# Patient Record
Sex: FEMALE | Race: WHITE | NOT HISPANIC OR LATINO
[De-identification: names, ages, dates, MRNs, and addresses within clinical notes are randomized per-mention and may not be internally consistent; named-entity substitution may affect disease eponyms.]

---

## 2020-11-17 ENCOUNTER — APPOINTMENT (OUTPATIENT)
Dept: HEART AND VASCULAR | Facility: CLINIC | Age: 70
End: 2020-11-17
Payer: COMMERCIAL

## 2020-11-17 ENCOUNTER — LABORATORY RESULT (OUTPATIENT)
Age: 70
End: 2020-11-17

## 2020-11-17 ENCOUNTER — NON-APPOINTMENT (OUTPATIENT)
Age: 70
End: 2020-11-17

## 2020-11-17 VITALS
BODY MASS INDEX: 23.16 KG/M2 | HEART RATE: 73 BPM | TEMPERATURE: 98.1 F | DIASTOLIC BLOOD PRESSURE: 84 MMHG | WEIGHT: 139 LBS | HEIGHT: 65 IN | SYSTOLIC BLOOD PRESSURE: 114 MMHG

## 2020-11-17 DIAGNOSIS — Z72.89 OTHER PROBLEMS RELATED TO LIFESTYLE: ICD-10-CM

## 2020-11-17 PROCEDURE — 99072 ADDL SUPL MATRL&STAF TM PHE: CPT

## 2020-11-17 PROCEDURE — 99204 OFFICE O/P NEW MOD 45 MIN: CPT

## 2020-11-17 PROCEDURE — 93000 ELECTROCARDIOGRAM COMPLETE: CPT

## 2020-11-17 NOTE — DISCUSSION/SUMMARY
[FreeTextEntry1] : Assessment/Plan:\par Patient is 70 year old female +smoker, +etoh seen today for cardiac eval. Reports 2 month history of exertional SOB and easy fatigue. Prior to 2 months, pt was very active without any exertional symptoms. Now unable to walk 10-15 min without fatigue/SOB. Pt denies CP at rest or exertion. No dizziness/palpitations. No LOC\par \par -Recent onset exertional/easy fatigue: EKG NSR with possible Qs III/AVF, v1-v2. Check ECHO - check EF, r/o wall motion abn. Check CBC - r/o anemia. Check TSH. CMP, lipids, hgba1c. \par Possible ischemic work up (CCTA) pending results of ECHO\par \par FU 2 weeks

## 2020-11-17 NOTE — REASON FOR VISIT
[Initial Evaluation] : an initial evaluation of [Dyspnea] : dyspnea [Fatigue] : feeling tired (fatigue)

## 2020-11-17 NOTE — HISTORY OF PRESENT ILLNESS
[FreeTextEntry1] : Patient is 70 year old female +smoker, +etoh seen today for cardiac eval. Reports 2 month history of exertional SOB and easy fatigue. Prior to 2 months, pt was very active without any exertional symptoms. Now unable to walk 10-15 min without fatigue/SOB. Pt denies CP at rest or exertion. No dizziness/palpitations. No LOC

## 2020-11-17 NOTE — PHYSICAL EXAM
[General Appearance - Well Developed] : well developed [Normal Appearance] : normal appearance [Well Groomed] : well groomed [General Appearance - Well Nourished] : well nourished [No Deformities] : no deformities [General Appearance - In No Acute Distress] : no acute distress [Normal Conjunctiva] : the conjunctiva exhibited no abnormalities [Eyelids - No Xanthelasma] : the eyelids demonstrated no xanthelasmas [Normal Oral Mucosa] : normal oral mucosa [No Oral Pallor] : no oral pallor [No Oral Cyanosis] : no oral cyanosis [Normal Jugular Venous A Waves Present] : normal jugular venous A waves present [Normal Jugular Venous V Waves Present] : normal jugular venous V waves present [No Jugular Venous Alvarez A Waves] : no jugular venous alvarez A waves [Heart Rate And Rhythm] : heart rate and rhythm were normal [Heart Sounds] : normal S1 and S2 [Murmurs] : no murmurs present [Respiration, Rhythm And Depth] : normal respiratory rhythm and effort [Exaggerated Use Of Accessory Muscles For Inspiration] : no accessory muscle use [Auscultation Breath Sounds / Voice Sounds] : lungs were clear to auscultation bilaterally [Abdomen Soft] : soft [Abdomen Tenderness] : non-tender [Abdomen Mass (___ Cm)] : no abdominal mass palpated [Abnormal Walk] : normal gait [Gait - Sufficient For Exercise Testing] : the gait was sufficient for exercise testing [Nail Clubbing] : no clubbing of the fingernails [Cyanosis, Localized] : no localized cyanosis [Petechial Hemorrhages (___cm)] : no petechial hemorrhages [Skin Color & Pigmentation] : normal skin color and pigmentation [] : no rash [No Venous Stasis] : no venous stasis [Skin Lesions] : no skin lesions [No Skin Ulcers] : no skin ulcer [No Xanthoma] : no  xanthoma was observed [Oriented To Time, Place, And Person] : oriented to person, place, and time [Affect] : the affect was normal [Mood] : the mood was normal [No Anxiety] : not feeling anxious

## 2020-12-15 ENCOUNTER — APPOINTMENT (OUTPATIENT)
Dept: HEART AND VASCULAR | Facility: CLINIC | Age: 70
End: 2020-12-15

## 2021-03-02 ENCOUNTER — APPOINTMENT (OUTPATIENT)
Dept: HEART AND VASCULAR | Facility: CLINIC | Age: 71
End: 2021-03-02

## 2021-04-13 ENCOUNTER — APPOINTMENT (OUTPATIENT)
Dept: HEART AND VASCULAR | Facility: CLINIC | Age: 71
End: 2021-04-13
Payer: COMMERCIAL

## 2021-04-13 ENCOUNTER — NON-APPOINTMENT (OUTPATIENT)
Age: 71
End: 2021-04-13

## 2021-04-13 VITALS
WEIGHT: 138 LBS | SYSTOLIC BLOOD PRESSURE: 112 MMHG | TEMPERATURE: 97.6 F | HEART RATE: 72 BPM | DIASTOLIC BLOOD PRESSURE: 80 MMHG | BODY MASS INDEX: 22.99 KG/M2 | HEIGHT: 65 IN

## 2021-04-13 VITALS — SYSTOLIC BLOOD PRESSURE: 128 MMHG | DIASTOLIC BLOOD PRESSURE: 78 MMHG | HEART RATE: 78 BPM

## 2021-04-13 PROCEDURE — ZZZZZ: CPT

## 2021-04-13 PROCEDURE — 99072 ADDL SUPL MATRL&STAF TM PHE: CPT

## 2021-04-13 PROCEDURE — 99213 OFFICE O/P EST LOW 20 MIN: CPT | Mod: 25

## 2021-04-13 PROCEDURE — 93306 TTE W/DOPPLER COMPLETE: CPT

## 2021-04-13 NOTE — DISCUSSION/SUMMARY
[FreeTextEntry1] : Assessment/Plan:\par Patient is 70 year old female +smoker, +etoh seen today for follow up of echo results.Reports 5 month history of exertional SOB and easy fatigue. Prior to 5 months, pt was very active without any exertional symptoms. Now unable to walk 10-15 min, climb 1 flight of stairs without fatigue/SOB. Pt denies CP at rest or exertion. No dizziness/palpitations. No LOC\par \par -Recent onset exertional/easy fatigue: labs reviewed: all within normal. ECHO: EF normal with concentric LVH. Given age, sig smoking hx will r/o CAD with CCTA\par \par -BP controlled\par \par FU post CCTA

## 2021-04-13 NOTE — PHYSICAL EXAM
[General Appearance - Well Developed] : well developed [Normal Appearance] : normal appearance [Well Groomed] : well groomed [General Appearance - Well Nourished] : well nourished [No Deformities] : no deformities [General Appearance - In No Acute Distress] : no acute distress [Normal Conjunctiva] : the conjunctiva exhibited no abnormalities [Eyelids - No Xanthelasma] : the eyelids demonstrated no xanthelasmas [Normal Oral Mucosa] : normal oral mucosa [No Oral Pallor] : no oral pallor [No Oral Cyanosis] : no oral cyanosis [Normal Jugular Venous A Waves Present] : normal jugular venous A waves present [Normal Jugular Venous V Waves Present] : normal jugular venous V waves present [No Jugular Venous Alvarez A Waves] : no jugular venous alvarez A waves [Respiration, Rhythm And Depth] : normal respiratory rhythm and effort [Exaggerated Use Of Accessory Muscles For Inspiration] : no accessory muscle use [Auscultation Breath Sounds / Voice Sounds] : lungs were clear to auscultation bilaterally [Heart Rate And Rhythm] : heart rate and rhythm were normal [Heart Sounds] : normal S1 and S2 [Murmurs] : no murmurs present [Abdomen Soft] : soft [Abdomen Tenderness] : non-tender [Abdomen Mass (___ Cm)] : no abdominal mass palpated [Abnormal Walk] : normal gait [Gait - Sufficient For Exercise Testing] : the gait was sufficient for exercise testing [Nail Clubbing] : no clubbing of the fingernails [Cyanosis, Localized] : no localized cyanosis [Petechial Hemorrhages (___cm)] : no petechial hemorrhages [Skin Color & Pigmentation] : normal skin color and pigmentation [] : no rash [No Venous Stasis] : no venous stasis [No Skin Ulcers] : no skin ulcer [Skin Lesions] : no skin lesions [No Xanthoma] : no  xanthoma was observed [Oriented To Time, Place, And Person] : oriented to person, place, and time [Affect] : the affect was normal [Mood] : the mood was normal [No Anxiety] : not feeling anxious

## 2021-04-13 NOTE — REASON FOR VISIT
[Follow-Up - Clinic] : a clinic follow-up of [Dyspnea] : dyspnea [FreeTextEntry1] : and echo results

## 2021-04-13 NOTE — HISTORY OF PRESENT ILLNESS
[FreeTextEntry1] : Patient is 70 year old female +smoker, +etoh seen today for follow up of echo results. Still complaining of exertional SOB and easy fatigue. Prior to 5 months, pt was very active without any exertional symptoms. Now unable to walk 10-15 min or climb 1 flight of stairs without fatigue/SOB. Pt denies CP at rest or exertion. No dizziness/palpitations. No LOC

## 2021-05-12 ENCOUNTER — APPOINTMENT (OUTPATIENT)
Dept: CT IMAGING | Facility: CLINIC | Age: 71
End: 2021-05-12
Payer: COMMERCIAL

## 2021-05-12 ENCOUNTER — OUTPATIENT (OUTPATIENT)
Dept: OUTPATIENT SERVICES | Facility: HOSPITAL | Age: 71
LOS: 1 days | End: 2021-05-12

## 2021-05-12 PROCEDURE — 75574 CT ANGIO HRT W/3D IMAGE: CPT | Mod: 26

## 2021-06-15 ENCOUNTER — APPOINTMENT (OUTPATIENT)
Dept: HEART AND VASCULAR | Facility: CLINIC | Age: 71
End: 2021-06-15
Payer: COMMERCIAL

## 2021-06-15 VITALS
HEIGHT: 65 IN | OXYGEN SATURATION: 96 % | HEART RATE: 73 BPM | SYSTOLIC BLOOD PRESSURE: 110 MMHG | TEMPERATURE: 97.6 F | DIASTOLIC BLOOD PRESSURE: 84 MMHG

## 2021-06-15 PROCEDURE — 99072 ADDL SUPL MATRL&STAF TM PHE: CPT

## 2021-06-15 PROCEDURE — 99213 OFFICE O/P EST LOW 20 MIN: CPT

## 2021-06-15 NOTE — DISCUSSION/SUMMARY
[FreeTextEntry1] : Assessment/Plan:\par Patient is 71 year old female +smoker, +etoh seen today for follow up of CCTA results. Still complaining of exertional SOB and easy fatigue. Prior to 5 months, pt was very active without any exertional symptoms. Now unable to walk 10-15 min or climb 1 flight of stairs without fatigue/SOB. Pt denies CP at rest or exertion. No dizziness/palpitations. No LOC\par \par CCTA: Ca score 295 with moderate pLAD and mLAD disease. In setting of exertional SOB, plan for cardiac cath with FFR of LAD\par \par BP controlled\par \par FU post cath

## 2021-06-15 NOTE — HISTORY OF PRESENT ILLNESS
[FreeTextEntry1] : Patient is 71 year old female +smoker, +etoh seen today for follow up of CCTA results. Still complaining of exertional SOB and easy fatigue. Prior to 5 months, pt was very active without any exertional symptoms. Now unable to walk 10-15 min or climb 1 flight of stairs without fatigue/SOB. Pt denies CP at rest or exertion. No dizziness/palpitations. No LOC

## 2021-06-15 NOTE — CARDIOLOGY SUMMARY
[de-identified] : EF 60, concentric LVH [de-identified] : 5/12/21: Ca score 296, moderate prox LAD and mLAD

## 2021-08-30 ENCOUNTER — APPOINTMENT (OUTPATIENT)
Dept: INTERNAL MEDICINE | Facility: CLINIC | Age: 71
End: 2021-08-30
Payer: COMMERCIAL

## 2021-08-30 VITALS
WEIGHT: 138 LBS | SYSTOLIC BLOOD PRESSURE: 130 MMHG | TEMPERATURE: 97.7 F | HEART RATE: 76 BPM | OXYGEN SATURATION: 99 % | DIASTOLIC BLOOD PRESSURE: 76 MMHG | BODY MASS INDEX: 22.99 KG/M2 | HEIGHT: 65 IN

## 2021-08-30 DIAGNOSIS — F51.01 PRIMARY INSOMNIA: ICD-10-CM

## 2021-08-30 DIAGNOSIS — R91.1 SOLITARY PULMONARY NODULE: ICD-10-CM

## 2021-08-30 DIAGNOSIS — M79.604 PAIN IN RIGHT LEG: ICD-10-CM

## 2021-08-30 PROCEDURE — 99204 OFFICE O/P NEW MOD 45 MIN: CPT

## 2021-08-30 NOTE — PHYSICAL EXAM
[No Edema] : there was no peripheral edema [Normal] : affect was normal and insight and judgment were intact [de-identified] : right buttocks tenderness.

## 2021-08-30 NOTE — REVIEW OF SYSTEMS
[Fatigue] : fatigue [Dyspnea on Exertion] : dyspnea on exertion [Joint Pain] : joint pain [Muscle Pain] : muscle pain [Anxiety] : anxiety [Depression] : depression [Negative] : Cardiovascular

## 2021-08-30 NOTE — PLAN
[FreeTextEntry1] : Insomnia- rx for trazodone\par \par CAD -strongly encourage following up with cardiac cath - referral reprinted for patient\par check a lipid profile\par \par Emphysema - pulm eval\par \par Lung nodule\par  - send for CT chest\par \par Smoking - wellbutrin  sr - trial reviewed how tot marcie it and potential aes\par  -smoking cessation counseling performed\par \par Buttock and leg pain.  tylenol prn and referral for PMR

## 2021-08-30 NOTE — HISTORY OF PRESENT ILLNESS
[de-identified] : This is a 70 yo f with non-obstructive CAD, anxiety, depression, emphysema, smoker.  \par Needs a new PMD\par s/p fall and rib fracture - seen at [facility]. 8/20. - tripped in middle of the night.\par recently with fatigue and WEBB - s/p eval with Dr Mitchell -CT calium score over 200\par reports a pain in her buttocks.right sided worse with standing- feels it standing. \par was due to get cardiac cath but did not get it yet.  \par also reports leg pain at night  cramping\par not sleeping well.\par Dealing with dental work - In the process of removing her teeth - this has been depressing - currently with a denture.\par She is on cymbalta for years - for anxiety and depression   \par recent CT showed a 1.2cm groundglass nodular opacity, needs 3 mo follow up\par  - shows emphysema.  \par \par Current smoker - failed chantix - caused nightmares.   1/2 PPD to 1 PPD for 40+  years.  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 \par Dexa: 5 years ago?

## 2021-08-31 DIAGNOSIS — R07.81 PLEURODYNIA: ICD-10-CM

## 2021-08-31 LAB
CHOLEST SERPL-MCNC: 269 MG/DL
HDLC SERPL-MCNC: 68 MG/DL
LDLC SERPL CALC-MCNC: 182 MG/DL
NONHDLC SERPL-MCNC: 202 MG/DL
TRIGL SERPL-MCNC: 100 MG/DL

## 2021-09-20 ENCOUNTER — OUTPATIENT (OUTPATIENT)
Dept: OUTPATIENT SERVICES | Facility: HOSPITAL | Age: 71
LOS: 1 days | End: 2021-09-20
Payer: COMMERCIAL

## 2021-09-20 ENCOUNTER — APPOINTMENT (OUTPATIENT)
Dept: CT IMAGING | Facility: HOSPITAL | Age: 71
End: 2021-09-20
Payer: COMMERCIAL

## 2021-09-20 PROCEDURE — 75574 CT ANGIO HRT W/3D IMAGE: CPT | Mod: 26

## 2021-09-20 PROCEDURE — 75574 CT ANGIO HRT W/3D IMAGE: CPT

## 2021-09-28 ENCOUNTER — APPOINTMENT (OUTPATIENT)
Dept: HEART AND VASCULAR | Facility: CLINIC | Age: 71
End: 2021-09-28
Payer: COMMERCIAL

## 2021-09-28 VITALS
BODY MASS INDEX: 22.99 KG/M2 | TEMPERATURE: 95.5 F | HEART RATE: 67 BPM | SYSTOLIC BLOOD PRESSURE: 110 MMHG | HEIGHT: 65 IN | WEIGHT: 138 LBS | DIASTOLIC BLOOD PRESSURE: 80 MMHG

## 2021-09-28 PROCEDURE — 99214 OFFICE O/P EST MOD 30 MIN: CPT

## 2021-09-28 NOTE — HISTORY OF PRESENT ILLNESS
[FreeTextEntry1] : Patient is 71 year old female +smoker, +etoh seen today for follow up of CCTA results. Complaining of worsening exertional SOB and easy fatigue. Prior to 5 months, pt was very active without any exertional symptoms. Now unable to walk 10-15 min or climb 1 flight of stairs without fatigue/SOB. Pt denies CP at rest or exertion. No dizziness/palpitations. No LOC

## 2021-09-28 NOTE — DISCUSSION/SUMMARY
[FreeTextEntry1] : Assessment/Plan:\par Patient is 71 year old female +smoker, +etoh seen today for follow up of CCTA results. Complaining of worsening exertional SOB and easy fatigue. Prior to 5 months, pt was very active without any exertional symptoms. Now unable to walk 10-15 min or climb 1 flight of stairs without fatigue/SOB. Pt denies CP at rest or exertion. No dizziness/palpitations. No LOC \par \par -With worsening exertional SOB. In setting of CCTA findings, plan for cardiac cath Weds Oct 6 with Dr Carmine Ames\par -HLD: , started on crestor 20 mg qd. Add ASA 81 mg qd \par -Will fu post cath

## 2021-10-01 VITALS
DIASTOLIC BLOOD PRESSURE: 84 MMHG | OXYGEN SATURATION: 100 % | SYSTOLIC BLOOD PRESSURE: 166 MMHG | RESPIRATION RATE: 16 BRPM | WEIGHT: 138.01 LBS | HEIGHT: 65 IN | HEART RATE: 61 BPM | TEMPERATURE: 97 F

## 2021-10-01 RX ORDER — CHLORHEXIDINE GLUCONATE 213 G/1000ML
1 SOLUTION TOPICAL ONCE
Refills: 0 | Status: DISCONTINUED | OUTPATIENT
Start: 2021-10-06 | End: 2021-10-07

## 2021-10-01 NOTE — H&P ADULT - HISTORY OF PRESENT ILLNESS
Covid:   Pharmacy:  Escort:     ***[facility]***    72 y/o F, current smoker, ETOH user, with PMHx significant for HLD, COPD, depression, who presented to her cardiologist with c/o worsening exertional SOB and increased fatigue on walking 10-15 mins, and climbing one flight of stairs, relieved with rest, x 5 mos.  Denies any chest pain, dizziness, n/v, diaphoresis, orthopnea, PND, palpitations, LE edema, syncope.  CCTA on 9/20/21 revealed the calcium score at 435 Agatston units, severe mid RCA stenosis, and moderate proximal LAD stenosis.  ECHO 4/13/21: EF 60%, minimal MR, mild concentric LVH.    In light of pt's risk factors, CCS class III anginal equivalent symptoms, and recent abnormal CCTA, he is referred for cardiac cath with possible intervention if clinically indicated.     Covid:   Pharmacy:  Escort:     ***[facility]***    72 y/o F, current smoker, ETOH user, with PMHx significant for HLD, COPD/emphysema, depression, who presented to her cardiologist with c/o worsening exertional SOB and increased fatigue on walking 10-15 mins, and climbing one flight of stairs, relieved with rest, x 5 mos.  Denies any chest pain, dizziness, n/v, diaphoresis, orthopnea, PND, palpitations, LE edema, syncope.  CCTA on 9/20/21 revealed the calcium score at 435 Agatston units, severe mid RCA stenosis, and moderate proximal LAD stenosis.  ECHO 4/13/21: EF 60%, minimal MR, mild concentric LVH.    In light of pt's risk factors, CCS class III anginal equivalent symptoms, and recent abnormal CCTA, he is referred for cardiac cath with possible intervention if clinically indicated.     Covid negative 10/04 in [facility]  Pharmacy:  Escort:     70 y/o F, current smoker, ETOH user, with PMHx significant for HLD, COPD/emphysema, depression, who presented to her cardiologist with c/o worsening exertional SOB and increased fatigue on walking 10-15 mins, and climbing one flight of stairs, relieved with rest, x 5 mos.  Denies any chest pain, dizziness, n/v, diaphoresis, orthopnea, PND, palpitations, LE edema, syncope.  CCTA on 9/20/21 revealed the calcium score at 435 Agatston units, severe mid RCA stenosis, and moderate proximal LAD stenosis.  ECHO 4/13/21: EF 60%, minimal MR, mild concentric LVH.    In light of pt's risk factors, CCS class III anginal equivalent symptoms, and recent abnormal CCTA, he is referred for cardiac cath with possible intervention if clinically indicated.     Cardiologist: Dr. Curtis Hardin negative 10/04 in [facility]  Pharmacy: [facility] [address]  Escort: sister    72 y/o F, current smoker, ETOH user, with PMHx significant for HLD, COPD/emphysema, depression, who presented to her cardiologist with c/o worsening exertional SOB and increased fatigue on walking 10-15 mins, and climbing one flight of stairs, relieved with rest, x 5 mos.  Denies any chest pain, dizziness, n/v, diaphoresis, orthopnea, PND, palpitations, LE edema, syncope.  CCTA on 9/20/21 revealed the calcium score at 435 Agatston units, severe mid RCA stenosis, and moderate proximal LAD stenosis.  ECHO 4/13/21: EF 60%, minimal MR, mild concentric LVH.    In light of pt's risk factors, CCS class III anginal equivalent symptoms, and recent abnormal CCTA, he is referred for cardiac cath with possible intervention if clinically indicated.

## 2021-10-01 NOTE — H&P ADULT - ASSESSMENT
72 y/o F, current smoker, ETOH user, with PMHx significant for HLD, COPD/emphysema, depression, who presents for cardiac catheterization secondary to CCS class III anginal symptoms in the setting of an abnormal CCTA.     ASA III Mallampati III  Precath consented  Started IVF NS @ 75cc/h   Loaded with ??    Patient is a candidate for moderate sedation.     Risks & benefits of procedure and alternative therapy have been explained to the patient including but not limited to: allergic reaction, bleeding w/possible need for blood transfusion, infection, renal and vascular compromise, limb damage, arrhythmia, stroke, vessel dissection/perforation, Myocardial infarction, emergent CABG. Informed consent obtained and in chart.  70 y/o F, current smoker, ETOH user, with PMHx significant for HLD, COPD/emphysema, depression, who presents for cardiac catheterization secondary to CCS class III anginal symptoms in the setting of an abnormal CCTA.     ASA III Mallampati III  Precath consented  Started IVF NS @ 75cc/h   Loaded with Plavix 600mg POx1 and ASA 325mg POx1     Patient is a candidate for moderate sedation.     Risks & benefits of procedure and alternative therapy have been explained to the patient including but not limited to: allergic reaction, bleeding w/possible need for blood transfusion, infection, renal and vascular compromise, limb damage, arrhythmia, stroke, vessel dissection/perforation, Myocardial infarction, emergent CABG. Informed consent obtained and in chart.

## 2021-10-01 NOTE — H&P ADULT - NSICDXPASTMEDICALHX_GEN_ALL_CORE_FT
PAST MEDICAL HISTORY:  Hiatal hernia     History of COPD     HLD (hyperlipidemia)      PAST MEDICAL HISTORY:  Emphysema/COPD     Hiatal hernia     HLD (hyperlipidemia)

## 2021-10-04 ENCOUNTER — LABORATORY RESULT (OUTPATIENT)
Age: 71
End: 2021-10-04

## 2021-10-06 ENCOUNTER — INPATIENT (INPATIENT)
Facility: HOSPITAL | Age: 71
LOS: 0 days | Discharge: HOME | DRG: 247 | End: 2021-10-07
Attending: INTERNAL MEDICINE | Admitting: INTERNAL MEDICINE
Payer: COMMERCIAL

## 2021-10-06 LAB
A1C WITH ESTIMATED AVERAGE GLUCOSE RESULT: 5.7 % — HIGH (ref 4–5.6)
ALBUMIN SERPL ELPH-MCNC: 4.5 G/DL — SIGNIFICANT CHANGE UP (ref 3.3–5)
ALP SERPL-CCNC: 93 U/L — SIGNIFICANT CHANGE UP (ref 40–120)
ALT FLD-CCNC: 12 U/L — SIGNIFICANT CHANGE UP (ref 10–45)
ANION GAP SERPL CALC-SCNC: 11 MMOL/L — SIGNIFICANT CHANGE UP (ref 5–17)
APTT BLD: 33 SEC — SIGNIFICANT CHANGE UP (ref 27.5–35.5)
AST SERPL-CCNC: 17 U/L — SIGNIFICANT CHANGE UP (ref 10–40)
BASOPHILS # BLD AUTO: 0.03 K/UL — SIGNIFICANT CHANGE UP (ref 0–0.2)
BASOPHILS # BLD AUTO: 0.04 K/UL — SIGNIFICANT CHANGE UP (ref 0–0.2)
BASOPHILS NFR BLD AUTO: 0.7 % — SIGNIFICANT CHANGE UP (ref 0–2)
BASOPHILS NFR BLD AUTO: 0.7 % — SIGNIFICANT CHANGE UP (ref 0–2)
BILIRUB SERPL-MCNC: 0.2 MG/DL — SIGNIFICANT CHANGE UP (ref 0.2–1.2)
BUN SERPL-MCNC: 12 MG/DL — SIGNIFICANT CHANGE UP (ref 7–23)
CALCIUM SERPL-MCNC: 10 MG/DL — SIGNIFICANT CHANGE UP (ref 8.4–10.5)
CHLORIDE SERPL-SCNC: 104 MMOL/L — SIGNIFICANT CHANGE UP (ref 96–108)
CHOLEST SERPL-MCNC: 179 MG/DL — SIGNIFICANT CHANGE UP
CK MB CFR SERPL CALC: 1.6 NG/ML — SIGNIFICANT CHANGE UP (ref 0–6.7)
CK SERPL-CCNC: 70 U/L — SIGNIFICANT CHANGE UP (ref 25–170)
CO2 SERPL-SCNC: 27 MMOL/L — SIGNIFICANT CHANGE UP (ref 22–31)
CREAT SERPL-MCNC: 0.75 MG/DL — SIGNIFICANT CHANGE UP (ref 0.5–1.3)
EOSINOPHIL # BLD AUTO: 0.15 K/UL — SIGNIFICANT CHANGE UP (ref 0–0.5)
EOSINOPHIL # BLD AUTO: 0.16 K/UL — SIGNIFICANT CHANGE UP (ref 0–0.5)
EOSINOPHIL NFR BLD AUTO: 2.7 % — SIGNIFICANT CHANGE UP (ref 0–6)
EOSINOPHIL NFR BLD AUTO: 3.6 % — SIGNIFICANT CHANGE UP (ref 0–6)
ESTIMATED AVERAGE GLUCOSE: 117 MG/DL — HIGH (ref 68–114)
GLUCOSE SERPL-MCNC: 105 MG/DL — HIGH (ref 70–99)
HCT VFR BLD CALC: 33.2 % — LOW (ref 34.5–45)
HCT VFR BLD CALC: 41.9 % — SIGNIFICANT CHANGE UP (ref 34.5–45)
HDLC SERPL-MCNC: 72 MG/DL — SIGNIFICANT CHANGE UP
HGB BLD-MCNC: 10.7 G/DL — LOW (ref 11.5–15.5)
HGB BLD-MCNC: 13.3 G/DL — SIGNIFICANT CHANGE UP (ref 11.5–15.5)
IMM GRANULOCYTES NFR BLD AUTO: 0.2 % — SIGNIFICANT CHANGE UP (ref 0–1.5)
IMM GRANULOCYTES NFR BLD AUTO: 0.4 % — SIGNIFICANT CHANGE UP (ref 0–1.5)
INR BLD: 1.04 — SIGNIFICANT CHANGE UP (ref 0.88–1.16)
LIPID PNL WITH DIRECT LDL SERPL: 95 MG/DL — SIGNIFICANT CHANGE UP
LYMPHOCYTES # BLD AUTO: 1.23 K/UL — SIGNIFICANT CHANGE UP (ref 1–3.3)
LYMPHOCYTES # BLD AUTO: 1.59 K/UL — SIGNIFICANT CHANGE UP (ref 1–3.3)
LYMPHOCYTES # BLD AUTO: 27.4 % — SIGNIFICANT CHANGE UP (ref 13–44)
LYMPHOCYTES # BLD AUTO: 29 % — SIGNIFICANT CHANGE UP (ref 13–44)
MCHC RBC-ENTMCNC: 29.1 PG — SIGNIFICANT CHANGE UP (ref 27–34)
MCHC RBC-ENTMCNC: 29.2 PG — SIGNIFICANT CHANGE UP (ref 27–34)
MCHC RBC-ENTMCNC: 31.7 GM/DL — LOW (ref 32–36)
MCHC RBC-ENTMCNC: 32.2 GM/DL — SIGNIFICANT CHANGE UP (ref 32–36)
MCV RBC AUTO: 90.7 FL — SIGNIFICANT CHANGE UP (ref 80–100)
MCV RBC AUTO: 91.7 FL — SIGNIFICANT CHANGE UP (ref 80–100)
MONOCYTES # BLD AUTO: 0.32 K/UL — SIGNIFICANT CHANGE UP (ref 0–0.9)
MONOCYTES # BLD AUTO: 0.38 K/UL — SIGNIFICANT CHANGE UP (ref 0–0.9)
MONOCYTES NFR BLD AUTO: 6.9 % — SIGNIFICANT CHANGE UP (ref 2–14)
MONOCYTES NFR BLD AUTO: 7.1 % — SIGNIFICANT CHANGE UP (ref 2–14)
NEUTROPHILS # BLD AUTO: 2.74 K/UL — SIGNIFICANT CHANGE UP (ref 1.8–7.4)
NEUTROPHILS # BLD AUTO: 3.31 K/UL — SIGNIFICANT CHANGE UP (ref 1.8–7.4)
NEUTROPHILS NFR BLD AUTO: 60.3 % — SIGNIFICANT CHANGE UP (ref 43–77)
NEUTROPHILS NFR BLD AUTO: 61 % — SIGNIFICANT CHANGE UP (ref 43–77)
NON HDL CHOLESTEROL: 107 MG/DL — SIGNIFICANT CHANGE UP
NRBC # BLD: 0 /100 WBCS — SIGNIFICANT CHANGE UP (ref 0–0)
NRBC # BLD: 0 /100 WBCS — SIGNIFICANT CHANGE UP (ref 0–0)
PLATELET # BLD AUTO: 233 K/UL — SIGNIFICANT CHANGE UP (ref 150–400)
PLATELET # BLD AUTO: 260 K/UL — SIGNIFICANT CHANGE UP (ref 150–400)
POTASSIUM SERPL-MCNC: 3.8 MMOL/L — SIGNIFICANT CHANGE UP (ref 3.5–5.3)
POTASSIUM SERPL-SCNC: 3.8 MMOL/L — SIGNIFICANT CHANGE UP (ref 3.5–5.3)
PROT SERPL-MCNC: 8 G/DL — SIGNIFICANT CHANGE UP (ref 6–8.3)
PROTHROM AB SERPL-ACNC: 12.4 SEC — SIGNIFICANT CHANGE UP (ref 10.6–13.6)
RBC # BLD: 3.66 M/UL — LOW (ref 3.8–5.2)
RBC # BLD: 4.57 M/UL — SIGNIFICANT CHANGE UP (ref 3.8–5.2)
RBC # FLD: 13.6 % — SIGNIFICANT CHANGE UP (ref 10.3–14.5)
RBC # FLD: 13.8 % — SIGNIFICANT CHANGE UP (ref 10.3–14.5)
SODIUM SERPL-SCNC: 142 MMOL/L — SIGNIFICANT CHANGE UP (ref 135–145)
TRIGL SERPL-MCNC: 62 MG/DL — SIGNIFICANT CHANGE UP
WBC # BLD: 4.49 K/UL — SIGNIFICANT CHANGE UP (ref 3.8–10.5)
WBC # BLD: 5.49 K/UL — SIGNIFICANT CHANGE UP (ref 3.8–10.5)
WBC # FLD AUTO: 4.49 K/UL — SIGNIFICANT CHANGE UP (ref 3.8–10.5)
WBC # FLD AUTO: 5.49 K/UL — SIGNIFICANT CHANGE UP (ref 3.8–10.5)

## 2021-10-06 PROCEDURE — 92928 PRQ TCAT PLMT NTRAC ST 1 LES: CPT | Mod: RC

## 2021-10-06 PROCEDURE — 37226: CPT | Mod: RT

## 2021-10-06 PROCEDURE — 93010 ELECTROCARDIOGRAM REPORT: CPT

## 2021-10-06 PROCEDURE — 93571 IV DOP VEL&/PRESS C FLO 1ST: CPT | Mod: 26,52,LD

## 2021-10-06 PROCEDURE — 86077 PHYS BLOOD BANK SERV XMATCH: CPT

## 2021-10-06 PROCEDURE — 93454 CORONARY ARTERY ANGIO S&I: CPT | Mod: 26,XU

## 2021-10-06 RX ORDER — CLOPIDOGREL BISULFATE 75 MG/1
75 TABLET, FILM COATED ORAL DAILY
Refills: 0 | Status: DISCONTINUED | OUTPATIENT
Start: 2021-10-07 | End: 2021-10-07

## 2021-10-06 RX ORDER — ONDANSETRON 8 MG/1
4 TABLET, FILM COATED ORAL ONCE
Refills: 0 | Status: COMPLETED | OUTPATIENT
Start: 2021-10-06 | End: 2021-10-06

## 2021-10-06 RX ORDER — INFLUENZA VIRUS VACCINE 15; 15; 15; 15 UG/.5ML; UG/.5ML; UG/.5ML; UG/.5ML
0.5 SUSPENSION INTRAMUSCULAR ONCE
Refills: 0 | Status: DISCONTINUED | OUTPATIENT
Start: 2021-10-06 | End: 2021-10-06

## 2021-10-06 RX ORDER — ONDANSETRON 8 MG/1
8 TABLET, FILM COATED ORAL ONCE
Refills: 0 | Status: DISCONTINUED | OUTPATIENT
Start: 2021-10-06 | End: 2021-10-06

## 2021-10-06 RX ORDER — ASPIRIN/CALCIUM CARB/MAGNESIUM 324 MG
81 TABLET ORAL DAILY
Refills: 0 | Status: DISCONTINUED | OUTPATIENT
Start: 2021-10-07 | End: 2021-10-07

## 2021-10-06 RX ORDER — CLOPIDOGREL BISULFATE 75 MG/1
600 TABLET, FILM COATED ORAL ONCE
Refills: 0 | Status: COMPLETED | OUTPATIENT
Start: 2021-10-06 | End: 2021-10-06

## 2021-10-06 RX ORDER — ATORVASTATIN CALCIUM 80 MG/1
80 TABLET, FILM COATED ORAL DAILY
Refills: 0 | Status: DISCONTINUED | OUTPATIENT
Start: 2021-10-06 | End: 2021-10-07

## 2021-10-06 RX ORDER — TRAZODONE HCL 50 MG
1 TABLET ORAL
Qty: 0 | Refills: 0 | DISCHARGE

## 2021-10-06 RX ORDER — BUPROPION HYDROCHLORIDE 150 MG/1
150 TABLET, EXTENDED RELEASE ORAL
Refills: 0 | Status: DISCONTINUED | OUTPATIENT
Start: 2021-10-06 | End: 2021-10-07

## 2021-10-06 RX ORDER — ONDANSETRON 8 MG/1
4 TABLET, FILM COATED ORAL EVERY 6 HOURS
Refills: 0 | Status: DISCONTINUED | OUTPATIENT
Start: 2021-10-06 | End: 2021-10-07

## 2021-10-06 RX ORDER — BUPROPION HYDROCHLORIDE 150 MG/1
150 TABLET, EXTENDED RELEASE ORAL EVERY 12 HOURS
Refills: 0 | Status: DISCONTINUED | OUTPATIENT
Start: 2021-10-06 | End: 2021-10-06

## 2021-10-06 RX ORDER — ASPIRIN/CALCIUM CARB/MAGNESIUM 324 MG
1 TABLET ORAL
Qty: 0 | Refills: 0 | DISCHARGE

## 2021-10-06 RX ORDER — ASPIRIN/CALCIUM CARB/MAGNESIUM 324 MG
325 TABLET ORAL ONCE
Refills: 0 | Status: COMPLETED | OUTPATIENT
Start: 2021-10-06 | End: 2021-10-06

## 2021-10-06 RX ORDER — SODIUM CHLORIDE 9 MG/ML
500 INJECTION INTRAMUSCULAR; INTRAVENOUS; SUBCUTANEOUS
Refills: 0 | Status: DISCONTINUED | OUTPATIENT
Start: 2021-10-06 | End: 2021-10-07

## 2021-10-06 RX ORDER — SODIUM CHLORIDE 9 MG/ML
500 INJECTION INTRAMUSCULAR; INTRAVENOUS; SUBCUTANEOUS
Refills: 0 | Status: DISCONTINUED | OUTPATIENT
Start: 2021-10-06 | End: 2021-10-06

## 2021-10-06 RX ORDER — MORPHINE SULFATE 50 MG/1
2 CAPSULE, EXTENDED RELEASE ORAL ONCE
Refills: 0 | Status: DISCONTINUED | OUTPATIENT
Start: 2021-10-06 | End: 2021-10-07

## 2021-10-06 RX ORDER — MORPHINE SULFATE 50 MG/1
2 CAPSULE, EXTENDED RELEASE ORAL ONCE
Refills: 0 | Status: DISCONTINUED | OUTPATIENT
Start: 2021-10-06 | End: 2021-10-06

## 2021-10-06 RX ORDER — INFLUENZA VIRUS VACCINE 15; 15; 15; 15 UG/.5ML; UG/.5ML; UG/.5ML; UG/.5ML
0.7 SUSPENSION INTRAMUSCULAR ONCE
Refills: 0 | Status: DISCONTINUED | OUTPATIENT
Start: 2021-10-06 | End: 2021-10-07

## 2021-10-06 RX ORDER — DULOXETINE HYDROCHLORIDE 30 MG/1
1 CAPSULE, DELAYED RELEASE ORAL
Qty: 0 | Refills: 0 | DISCHARGE

## 2021-10-06 RX ADMIN — MORPHINE SULFATE 2 MILLIGRAM(S): 50 CAPSULE, EXTENDED RELEASE ORAL at 21:00

## 2021-10-06 RX ADMIN — SODIUM CHLORIDE 75 MILLILITER(S): 9 INJECTION INTRAMUSCULAR; INTRAVENOUS; SUBCUTANEOUS at 14:20

## 2021-10-06 RX ADMIN — CLOPIDOGREL BISULFATE 600 MILLIGRAM(S): 75 TABLET, FILM COATED ORAL at 14:20

## 2021-10-06 RX ADMIN — Medication 325 MILLIGRAM(S): at 14:20

## 2021-10-06 RX ADMIN — ATORVASTATIN CALCIUM 80 MILLIGRAM(S): 80 TABLET, FILM COATED ORAL at 22:00

## 2021-10-06 RX ADMIN — ONDANSETRON 4 MILLIGRAM(S): 8 TABLET, FILM COATED ORAL at 15:43

## 2021-10-06 RX ADMIN — MORPHINE SULFATE 2 MILLIGRAM(S): 50 CAPSULE, EXTENDED RELEASE ORAL at 20:45

## 2021-10-07 ENCOUNTER — TRANSCRIPTION ENCOUNTER (OUTPATIENT)
Age: 71
End: 2021-10-07

## 2021-10-07 VITALS — TEMPERATURE: 98 F

## 2021-10-07 PROBLEM — K44.9 DIAPHRAGMATIC HERNIA WITHOUT OBSTRUCTION OR GANGRENE: Chronic | Status: ACTIVE | Noted: 2021-10-01

## 2021-10-07 PROBLEM — E78.5 HYPERLIPIDEMIA, UNSPECIFIED: Chronic | Status: ACTIVE | Noted: 2021-10-01

## 2021-10-07 PROBLEM — J43.9 EMPHYSEMA, UNSPECIFIED: Chronic | Status: ACTIVE | Noted: 2021-10-01

## 2021-10-07 LAB
ALBUMIN SERPL ELPH-MCNC: 3.8 G/DL — SIGNIFICANT CHANGE UP (ref 3.3–5)
ALP SERPL-CCNC: 78 U/L — SIGNIFICANT CHANGE UP (ref 40–120)
ALT FLD-CCNC: 9 U/L — LOW (ref 10–45)
ANION GAP SERPL CALC-SCNC: 9 MMOL/L — SIGNIFICANT CHANGE UP (ref 5–17)
AST SERPL-CCNC: 14 U/L — SIGNIFICANT CHANGE UP (ref 10–40)
BASOPHILS # BLD AUTO: 0.03 K/UL — SIGNIFICANT CHANGE UP (ref 0–0.2)
BASOPHILS NFR BLD AUTO: 0.6 % — SIGNIFICANT CHANGE UP (ref 0–2)
BILIRUB SERPL-MCNC: 0.3 MG/DL — SIGNIFICANT CHANGE UP (ref 0.2–1.2)
BUN SERPL-MCNC: 10 MG/DL — SIGNIFICANT CHANGE UP (ref 7–23)
CALCIUM SERPL-MCNC: 9.2 MG/DL — SIGNIFICANT CHANGE UP (ref 8.4–10.5)
CHLORIDE SERPL-SCNC: 103 MMOL/L — SIGNIFICANT CHANGE UP (ref 96–108)
CO2 SERPL-SCNC: 27 MMOL/L — SIGNIFICANT CHANGE UP (ref 22–31)
COVID-19 SPIKE DOMAIN AB INTERP: POSITIVE
COVID-19 SPIKE DOMAIN ANTIBODY RESULT: >250 U/ML — HIGH
CREAT SERPL-MCNC: 0.85 MG/DL — SIGNIFICANT CHANGE UP (ref 0.5–1.3)
EOSINOPHIL # BLD AUTO: 0.19 K/UL — SIGNIFICANT CHANGE UP (ref 0–0.5)
EOSINOPHIL NFR BLD AUTO: 3.5 % — SIGNIFICANT CHANGE UP (ref 0–6)
GLUCOSE SERPL-MCNC: 95 MG/DL — SIGNIFICANT CHANGE UP (ref 70–99)
HCT VFR BLD CALC: 35.2 % — SIGNIFICANT CHANGE UP (ref 34.5–45)
HCV AB S/CO SERPL IA: 0.04 S/CO — SIGNIFICANT CHANGE UP
HCV AB SERPL-IMP: SIGNIFICANT CHANGE UP
HGB BLD-MCNC: 11.1 G/DL — LOW (ref 11.5–15.5)
IMM GRANULOCYTES NFR BLD AUTO: 0.2 % — SIGNIFICANT CHANGE UP (ref 0–1.5)
LYMPHOCYTES # BLD AUTO: 1.31 K/UL — SIGNIFICANT CHANGE UP (ref 1–3.3)
LYMPHOCYTES # BLD AUTO: 24.2 % — SIGNIFICANT CHANGE UP (ref 13–44)
MAGNESIUM SERPL-MCNC: 2 MG/DL — SIGNIFICANT CHANGE UP (ref 1.6–2.6)
MCHC RBC-ENTMCNC: 29.3 PG — SIGNIFICANT CHANGE UP (ref 27–34)
MCHC RBC-ENTMCNC: 31.5 GM/DL — LOW (ref 32–36)
MCV RBC AUTO: 92.9 FL — SIGNIFICANT CHANGE UP (ref 80–100)
MONOCYTES # BLD AUTO: 0.49 K/UL — SIGNIFICANT CHANGE UP (ref 0–0.9)
MONOCYTES NFR BLD AUTO: 9 % — SIGNIFICANT CHANGE UP (ref 2–14)
NEUTROPHILS # BLD AUTO: 3.39 K/UL — SIGNIFICANT CHANGE UP (ref 1.8–7.4)
NEUTROPHILS NFR BLD AUTO: 62.5 % — SIGNIFICANT CHANGE UP (ref 43–77)
NRBC # BLD: 0 /100 WBCS — SIGNIFICANT CHANGE UP (ref 0–0)
PLATELET # BLD AUTO: 230 K/UL — SIGNIFICANT CHANGE UP (ref 150–400)
POTASSIUM SERPL-MCNC: 3.8 MMOL/L — SIGNIFICANT CHANGE UP (ref 3.5–5.3)
POTASSIUM SERPL-SCNC: 3.8 MMOL/L — SIGNIFICANT CHANGE UP (ref 3.5–5.3)
PROT SERPL-MCNC: 6.6 G/DL — SIGNIFICANT CHANGE UP (ref 6–8.3)
RBC # BLD: 3.79 M/UL — LOW (ref 3.8–5.2)
RBC # FLD: 13.9 % — SIGNIFICANT CHANGE UP (ref 10.3–14.5)
SARS-COV-2 IGG+IGM SERPL QL IA: >250 U/ML — HIGH
SARS-COV-2 IGG+IGM SERPL QL IA: POSITIVE
SODIUM SERPL-SCNC: 139 MMOL/L — SIGNIFICANT CHANGE UP (ref 135–145)
TSH SERPL-MCNC: 2.09 UIU/ML — SIGNIFICANT CHANGE UP (ref 0.27–4.2)
WBC # BLD: 5.42 K/UL — SIGNIFICANT CHANGE UP (ref 3.8–10.5)
WBC # FLD AUTO: 5.42 K/UL — SIGNIFICANT CHANGE UP (ref 3.8–10.5)

## 2021-10-07 PROCEDURE — 86850 RBC ANTIBODY SCREEN: CPT

## 2021-10-07 PROCEDURE — 86803 HEPATITIS C AB TEST: CPT

## 2021-10-07 PROCEDURE — 82550 ASSAY OF CK (CPK): CPT

## 2021-10-07 PROCEDURE — C1769: CPT

## 2021-10-07 PROCEDURE — C1725: CPT

## 2021-10-07 PROCEDURE — 86901 BLOOD TYPING SEROLOGIC RH(D): CPT

## 2021-10-07 PROCEDURE — 86900 BLOOD TYPING SEROLOGIC ABO: CPT

## 2021-10-07 PROCEDURE — 85025 COMPLETE CBC W/AUTO DIFF WBC: CPT

## 2021-10-07 PROCEDURE — 83735 ASSAY OF MAGNESIUM: CPT

## 2021-10-07 PROCEDURE — C1887: CPT

## 2021-10-07 PROCEDURE — 36415 COLL VENOUS BLD VENIPUNCTURE: CPT

## 2021-10-07 PROCEDURE — 80061 LIPID PANEL: CPT

## 2021-10-07 PROCEDURE — 99239 HOSP IP/OBS DSCHRG MGMT >30: CPT

## 2021-10-07 PROCEDURE — C1760: CPT

## 2021-10-07 PROCEDURE — 85730 THROMBOPLASTIN TIME PARTIAL: CPT

## 2021-10-07 PROCEDURE — 84443 ASSAY THYROID STIM HORMONE: CPT

## 2021-10-07 PROCEDURE — 86769 SARS-COV-2 COVID-19 ANTIBODY: CPT

## 2021-10-07 PROCEDURE — 82553 CREATINE MB FRACTION: CPT

## 2021-10-07 PROCEDURE — C1894: CPT

## 2021-10-07 PROCEDURE — 83036 HEMOGLOBIN GLYCOSYLATED A1C: CPT

## 2021-10-07 PROCEDURE — C1874: CPT

## 2021-10-07 PROCEDURE — 80053 COMPREHEN METABOLIC PANEL: CPT

## 2021-10-07 PROCEDURE — 93005 ELECTROCARDIOGRAM TRACING: CPT

## 2021-10-07 PROCEDURE — 85610 PROTHROMBIN TIME: CPT

## 2021-10-07 RX ORDER — ROSUVASTATIN CALCIUM 5 MG/1
1 TABLET ORAL
Qty: 30 | Refills: 0
Start: 2021-10-07 | End: 2021-11-05

## 2021-10-07 RX ORDER — CLOPIDOGREL BISULFATE 75 MG/1
1 TABLET, FILM COATED ORAL
Qty: 30 | Refills: 0
Start: 2021-10-07 | End: 2021-11-05

## 2021-10-07 RX ORDER — NICOTINE POLACRILEX 2 MG
1 GUM BUCCAL
Qty: 30 | Refills: 0
Start: 2021-10-07 | End: 2021-11-05

## 2021-10-07 RX ORDER — ROSUVASTATIN CALCIUM 5 MG/1
1 TABLET ORAL
Qty: 30 | Refills: 9
Start: 2021-10-07 | End: 2022-08-02

## 2021-10-07 RX ORDER — ISOSORBIDE MONONITRATE 60 MG/1
1 TABLET, EXTENDED RELEASE ORAL
Qty: 30 | Refills: 0
Start: 2021-10-07 | End: 2021-11-05

## 2021-10-07 RX ORDER — CLOPIDOGREL BISULFATE 75 MG/1
1 TABLET, FILM COATED ORAL
Qty: 30 | Refills: 9
Start: 2021-10-07 | End: 2022-08-02

## 2021-10-07 RX ORDER — NICOTINE POLACRILEX 2 MG
1 GUM BUCCAL DAILY
Refills: 0 | Status: DISCONTINUED | OUTPATIENT
Start: 2021-10-07 | End: 2021-10-07

## 2021-10-07 RX ORDER — ASPIRIN/CALCIUM CARB/MAGNESIUM 324 MG
1 TABLET ORAL
Qty: 30 | Refills: 0
Start: 2021-10-07 | End: 2021-11-05

## 2021-10-07 RX ORDER — ROSUVASTATIN CALCIUM 5 MG/1
1 TABLET ORAL
Qty: 0 | Refills: 0 | DISCHARGE

## 2021-10-07 RX ORDER — ASPIRIN/CALCIUM CARB/MAGNESIUM 324 MG
1 TABLET ORAL
Qty: 30 | Refills: 9
Start: 2021-10-07 | End: 2022-08-02

## 2021-10-07 RX ORDER — POTASSIUM CHLORIDE 20 MEQ
20 PACKET (EA) ORAL ONCE
Refills: 0 | Status: COMPLETED | OUTPATIENT
Start: 2021-10-07 | End: 2021-10-07

## 2021-10-07 RX ADMIN — CLOPIDOGREL BISULFATE 75 MILLIGRAM(S): 75 TABLET, FILM COATED ORAL at 10:30

## 2021-10-07 RX ADMIN — BUPROPION HYDROCHLORIDE 150 MILLIGRAM(S): 150 TABLET, EXTENDED RELEASE ORAL at 10:30

## 2021-10-07 RX ADMIN — Medication 20 MILLIEQUIVALENT(S): at 10:31

## 2021-10-07 RX ADMIN — Medication 81 MILLIGRAM(S): at 10:30

## 2021-10-07 NOTE — DISCHARGE NOTE PROVIDER - NSDCMRMEDTOKEN_GEN_ALL_CORE_FT
buPROPion 150 mg/12 hours (SR) oral tablet, extended release: 1 tab(s) orally 2 times a day  rosuvastatin 20 mg oral tablet: 1 tab(s) orally once a day   buPROPion 150 mg/12 hours (SR) oral tablet, extended release: 1 tab(s) orally 2 times a day   Aspirin Enteric Coated 81 mg oral delayed release tablet: 1 tab(s) orally once a day  Aspirin Enteric Coated 81 mg oral delayed release tablet: 1 tab(s) orally once a day   buPROPion 150 mg/12 hours (SR) oral tablet, extended release: 1 tab(s) orally 2 times a day  clopidogrel 75 mg oral tablet: 1 tab(s) orally once a day  Crestor 40 mg oral tablet: 1 tab(s) orally once a day   Crestor 40 mg oral tablet: 1 tab(s) orally once a day   isosorbide mononitrate 30 mg oral tablet, extended release: 1 tab(s) orally once a day   nicotine 21 mg/24 hr transdermal film, extended release: 1 patch transdermal once a day, As Needed   Plavix 75 mg oral tablet: 1 tab(s) orally once a day

## 2021-10-07 NOTE — DISCHARGE NOTE NURSING/CASE MANAGEMENT/SOCIAL WORK - NSDCFUADDAPPT_GEN_ALL_CORE_FT
Please bring your Insurance card, Photo ID, Covid-19 vaccination card (if applicable) and Discharge paperwork to the following appointment:    (1) Please follow up with your Cardiology Provider, Dr. Charisse Acevedo at [address] [place] [address], [place] [address] on 10/19/2021 at 1:45pm.    Appointment was scheduled by Ms. NAWAF Valentine, Referral Coordinator.

## 2021-10-07 NOTE — DISCHARGE NOTE PROVIDER - PROVIDER TOKENS
PROVIDER:[TOKEN:[[id number]]] FREE:[LAST:[Curtis],FIRST:[Charisse JARA],PHONE:[phone]],FAX:[(   [phone]    -],ADDRESS:[CARDIOVASCULAR DISEASE  [address] [place] [address]],SCHEDULEDAPPT:[10/19/2021],SCHEDULEDAPPTTIME:[01:45 PM]]

## 2021-10-07 NOTE — DISCHARGE NOTE NURSING/CASE MANAGEMENT/SOCIAL WORK - NSDCPEEMAIL_GEN_ALL_CORE
[facility] for Tobacco Control email [email or web address]

## 2021-10-07 NOTE — DISCHARGE NOTE PROVIDER - NSDCCPCAREPLAN_GEN_ALL_CORE_FT
PRINCIPAL DISCHARGE DIAGNOSIS  Diagnosis: Acute coronary syndrome  Assessment and Plan of Treatment:        PRINCIPAL DISCHARGE DIAGNOSIS  Diagnosis: Acute coronary syndrome  Assessment and Plan of Treatment: You have been diagnosed with acute coronary syndrome while in the hospital. This condition develops when part of the heart muscle doesn't get enough oxygen.   Your home medication Crestor 20mg daily is now increased to Crestor 40mg daily. Please take your medications as instructed.  Follow the below lifestyle change recommendations :   - Avoid smoking and do not let anyone smoke in your house. If you find it difficult to quit ask for help or contact your doctor for a referral to counselling groups   - Avoid fatty food and stay away from fast food restaurant. Excessive consumption of fatty food can increase your cholesterol level which is a risk factorfor cardiovascular disease   - If you already have hypertension, take your blood pressure medication as prescribed.   - Consult your doctor about any change in your exercise plan    - Take your blood thinners and cholesterol medication as prescribed. Do not run out on your medication and always contact your doctor if you need refills   - Call 911 or report to the emergency department if you have chest pain or pressure sensation in your chest, especially if the pain goes to your neck, jaw, back or arms or it is accompanied by sweating, nausea and vomiting    - Contact your doctor if you have worsening shortness of breath, during activity or at rest, or if you feel dizzy, if you feel that your hertbeat is very fast very slow or not steady   - Any changes in your chest pain may mean that your disease is getting worse : These changes include : chest pain that is getting stronger, that is occuring more frequently, that lasts longer, that occurs when you are not active or medicines do not improve your symptoms like they used to         PRINCIPAL DISCHARGE DIAGNOSIS  Diagnosis: Acute coronary syndrome  Assessment and Plan of Treatment: You have been diagnosed with acute coronary syndrome while in the hospital. This condition develops when part of the heart muscle doesn't get enough oxygen.   Your home medication Crestor 20mg daily is now increased to Crestor 40mg daily. Take your medication aspirin 81mg once a day and Plavix 75mg once a day and follow up with your cardiologist at your next scheduled appointment.  Follow the below lifestyle change recommendations :   - Avoid smoking and do not let anyone smoke in your house. If you find it difficult to quit ask for help or contact your doctor for a referral to counselling groups   - Avoid fatty food and stay away from fast food restaurant. Excessive consumption of fatty food can increase your cholesterol level which is a risk factorfor cardiovascular disease   - If you already have hypertension, take your blood pressure medication as prescribed.   - Consult your doctor about any change in your exercise plan    - Take your blood thinners and cholesterol medication as prescribed. Do not run out on your medication and always contact your doctor if you need refills   - Call 911 or report to the emergency department if you have chest pain or pressure sensation in your chest, especially if the pain goes to your neck, jaw, back or arms or it is accompanied by sweating, nausea and vomiting    - Contact your doctor if you have worsening shortness of breath, during activity or at rest, or if you feel dizzy, if you feel that your hertbeat is very fast very slow or not steady   - Any changes in your chest pain may mean that your disease is getting worse : These changes include : chest pain that is getting stronger, that is occuring more frequently, that lasts longer, that occurs when you are not active or medicines do not improve your symptoms like they used to         PRINCIPAL DISCHARGE DIAGNOSIS  Diagnosis: Acute coronary syndrome  Assessment and Plan of Treatment: You have been diagnosed with acute coronary syndrome while in the hospital. This condition develops when part of the heart muscle doesn't get enough oxygen.   Your home medication Crestor 20mg daily is now increased to Crestor 40mg daily. Take your medication aspirin 81mg once a day and Plavix 75mg once a day and follow up with your cardiologist at your next scheduled appointment. In addition, please talk with your primary care physician or cardiologist regarding starting a beta-blocker given your history of COPD/emphysema.  Follow the below lifestyle change recommendations :   - Avoid smoking and do not let anyone smoke in your house. If you find it difficult to quit ask for help or contact your doctor for a referral to counselling groups   - Avoid fatty food and stay away from fast food restaurant. Excessive consumption of fatty food can increase your cholesterol level which is a risk factorfor cardiovascular disease   - If you already have hypertension, take your blood pressure medication as prescribed.   - Consult your doctor about any change in your exercise plan    - Take your blood thinners and cholesterol medication as prescribed. Do not run out on your medication and always contact your doctor if you need refills   - Call 911 or report to the emergency department if you have chest pain or pressure sensation in your chest, especially if the pain goes to your neck, jaw, back or arms or it is accompanied by sweating, nausea and vomiting    - Contact your doctor if you have worsening shortness of breath, during activity or at rest, or if you feel dizzy, if you feel that your hertbeat is very fast very slow or not steady   - Any changes in your chest pain may mean that your disease is getting worse : These changes include : chest pain that is getting stronger, that is occuring more frequently, that lasts longer, that occurs when you are not active or medicines do not improve your symptoms like they used to

## 2021-10-07 NOTE — DISCHARGE NOTE PROVIDER - HOSPITAL COURSE
#Discharge: do not delete    72 y/o F, current smoker, ETOH user, with PMHx significant for HLD, COPD/emphysema, depression, who presented to her cardiologist with c/o worsening exertional SOB and increased fatigue on walking 10-15 mins, and climbing one flight of stairs, relieved with rest, x 5 mos  and presented for cardiac catheterization secondary to CCS class III anginal symptoms in the setting of an abnormal CCTA..  Denied any chest pain, dizziness, n/v, diaphoresis, orthopnea, PND, palpitations, LE edema, syncope.  CCTA on 9/20/21 revealed the calcium score at 435 Agatston units, severe mid RCA stenosis, and moderate proximal LAD stenosis.  ECHO 4/13/21: EF 60%, minimal MR, mild concentric LVH. Started IVF NS @ 75cc/h and loaded with Plavix 600mg POx1 and ASA 325mg POx1. s/p PCI 10/06/2021: JOHN x 1 mLCx, JOHN x 1 mRCA, pLAD (negative IFR), Right radial TR failed, due at 9 PM. Right groin - failed perclose and perforated, left groin w/ balloon and covered stent covered left common femoral artery. EF: 60% as per echo.  DAPT: Asp/Plavix   Statin: Rosuvastatin 20 changed to Atorva 80          Dx: Heart Failure this Admit, History of Heart Failure, Unstable Angina/ACS/NSTEMI/STEMI: YES/NO            If YES: 7 day Follow-Up Appointment Made: Yes/No, Yes: Date/Time; IF No, why not?           Cardiac Rehab (STEMI/NSTEMI/ACS/Unstable Angina/CHF/Post PCI):            *Education on benefits of Cardiac Rehab provided to patient: Yes/No         *Referral and Prescription Given for Cardiac Rehab : Yes/No.  If No, Why Not?         *Pt given list of locations & instructed to contact their insurance company to review list of participating providers    Reasons for No Cardiac Rehab Referral Rx (Must select 1 or more options):                Patient Refused            Medical Reason: ex needs Home Care, Home PT            Patient lacks medical coverage for Cardiac Rehab            Pt discharged to Nursing Care/[facility]/Long term Care Facility            Patient Lacks Transportation or no cardiac rehab within 60 minutes driving range            Patient already participates in Cardiac Rehab            Other: (provide details) ex: Hospice patient      AMI: Beta Blocker Prescribed: Yes/No. If no, Why not?            ACE-I/ARB Prescribed: Yes/No. If no, Why not? ex: Entresto prescribed    Statin Prescribed (STEMI/NSTEMI/UA &/OR PCI this admission):  Yes/No; If No, Why Not?  DAPT: Prescriptions for Aspirin/Plavix/Brilinta/Effient e-prescribed to patient's pharmacy Yes/No__.                *No Aspirin/Plavix/Brilinta/Effient prescribed due to ___.     #Discharge: do not delete    70 y/o F, current smoker, ETOH user, with PMHx significant for HLD, COPD/emphysema, depression, who presented to her cardiologist with c/o worsening exertional SOB and increased fatigue on walking 10-15 mins, and climbing one flight of stairs, relieved with rest, x 5 mos  and presented for cardiac catheterization secondary to CCS class III anginal symptoms in the setting of an abnormal CCTA..  Denied any chest pain, dizziness, n/v, diaphoresis, orthopnea, PND, palpitations, LE edema, syncope.  CCTA on 9/20/21 revealed the calcium score at 435 Agatston units, severe mid RCA stenosis, and moderate proximal LAD stenosis.  ECHO 4/13/21: EF 60%, minimal MR, mild concentric LVH. Started IVF NS @ 75cc/h and loaded with Plavix 600mg POx1 and ASA 325mg POx1. s/p PCI 10/06/2021: JOHN x 1 mLCx, JOHN x 1 mRCA, pLAD (negative IFR), Right radial TR failed, due at 9 PM. Right groin - failed perclose and perforated, left groin w/ balloon and covered stent covered left common femoral artery. EF: 60% as per echo.  DAPT: Asp/Plavix       Dx: Heart Failure this Admit, History of Heart Failure, Unstable Angina/ACS/NSTEMI/STEMI: No, no heart failure or STEMI         If YES: 7 day Follow-Up Appointment Made: Yes/No, Yes: Date/Time; IF No, why not? No history of heart failure           Cardiac Rehab (STEMI/NSTEMI/ACS/Unstable Angina/CHF/Post PCI): No, not necessary    Reasons for No Cardiac Rehab Referral Rx (Must select 1 or more options):                Other: Patient does not need or require cardiac rehab      AMI: Beta Blocker Prescribed: Yes/No. If no, Why not? Advised patient to discuss with cardiologist to start a beta-blocker given her history of COPD/emphysema            ACE-I/ARB Prescribed: Yes/No. If no, Why not? No, no indication.    Statin Prescribed (STEMI/NSTEMI/UA &/OR PCI this admission):  Yes, increased home medication crestor 20mg daily to crestor 40mg daily  DAPT: Prescriptions for Aspirin/Plavix/Brilinta/Effient e-prescribed to patient's pharmacy Yes. One month to Vivo and rest of supply to patient's pharmacy                    #Discharge: do not delete    70 y/o F, current smoker, ETOH user, with PMHx significant for HLD, COPD/emphysema, depression, who presented to her cardiologist with c/o worsening exertional SOB and increased fatigue on walking 10-15 mins, and climbing one flight of stairs, relieved with rest, x 5 mos  and presented for cardiac catheterization secondary to CCS class III anginal symptoms in the setting of an abnormal CCTA..  Denied any chest pain, dizziness, n/v, diaphoresis, orthopnea, PND, palpitations, LE edema, syncope.  CCTA on 9/20/21 revealed the calcium score at 435 Agatston units, severe mid RCA stenosis, and moderate proximal LAD stenosis.  ECHO 4/13/21: EF 60%, minimal MR, mild concentric LVH. Started IVF NS @ 75cc/h and loaded with Plavix 600mg POx1 and ASA 325mg POx1. s/p PCI 10/06/2021: JOHN x 1 mLCx, JOHN x 1 mRCA, pLAD (negative IFR), Right radial TR failed, due at 9 PM. Right groin - failed perclose and perforated, left groin w/ balloon and covered stent covered left common femoral artery. EF: 60% as per echo.  DAPT: Asp/Plavix       Dx: Heart Failure this Admit, History of Heart Failure, Unstable Angina/ACS/NSTEMI/STEMI: No, no heart failure or STEMI         If YES: 7 day Follow-Up Appointment Made: Yes/No, Yes: Date/Time; IF No, why not? No history of heart failure           Cardiac Rehab (STEMI/NSTEMI/ACS/Unstable Angina/CHF/Post PCI): Yes, called and faxed script to patient.         *Education on benefits of Cardiac Rehab provided to patient: Yes         *Referral and Prescription Given for Cardiac Rehab : Yes         *Pt given list of locations & instructed to contact their insurance company to review list of participating providers      AMI: Beta Blocker Prescribed: Yes/No. If no, Why not? Advised patient to discuss with cardiologist to start a beta-blocker given her history of COPD/emphysema            ACE-I/ARB Prescribed: Yes/No. If no, Why not? No, no indication.    Statin Prescribed (STEMI/NSTEMI/UA &/OR PCI this admission):  Yes, increased home medication crestor 20mg daily to crestor 40mg daily  DAPT: Prescriptions for Aspirin/Plavix/Brilinta/Effient e-prescribed to patient's pharmacy Yes. One month to Vivo and rest of supply to patient's pharmacy

## 2021-10-07 NOTE — CHART NOTE - NSCHARTNOTEFT_GEN_A_CORE
Cardiac rehab script and packet emailed to patient's email.   Pt aware she may  her physical script and packet from our [facility]  anytime.

## 2021-10-07 NOTE — DISCHARGE NOTE PROVIDER - CARE PROVIDER_API CALL
Charisse Acevedo)  Cardiovascular Disease; Internal Medicine  [address] [place] [address], [facility] [address], [address] [place] [address]  Phone: [phone]  Fax: [phone]  Follow Up Time:    Charisse Acevedo  CARDIOVASCULAR DISEASE  [address], [address]  [place], [place] [address]  Phone: [phone]  Fax: (   )    -  Scheduled Appointment: 10/19/2021 01:45 PM

## 2021-10-07 NOTE — DISCHARGE NOTE NURSING/CASE MANAGEMENT/SOCIAL WORK - PATIENT PORTAL LINK FT
You can access the FollowMyHealth Patient Portal offered by [facility] by registering at the following website: [email or web address]. By joining [employer]’s FollowMyHealth portal, you will also be able to view your health information using other applications (apps) compatible with our system.

## 2021-10-07 NOTE — DISCHARGE NOTE NURSING/CASE MANAGEMENT/SOCIAL WORK - NSDCPEWEB_GEN_ALL_CORE
[facility] for Tobacco Control website --- [email or web address] website --- [email or web address]

## 2021-10-07 NOTE — DISCHARGE NOTE PROVIDER - NSDCFUADDAPPT_GEN_ALL_CORE_FT
Please bring your Insurance card, Photo ID, Covid-19 vaccination card (if applicable) and Discharge paperwork to the following appointment:    (1) Please follow up with your Cardiology Provider, Dr. Charisse Acevedo at [address] [place] [address], [place] [address] on 10/19/2021 at 1:45pm.    Appointment was scheduled by Ms. NAWAF Valentine, Referral Coordinator.

## 2021-10-19 ENCOUNTER — NON-APPOINTMENT (OUTPATIENT)
Age: 71
End: 2021-10-19

## 2021-10-19 ENCOUNTER — APPOINTMENT (OUTPATIENT)
Dept: HEART AND VASCULAR | Facility: CLINIC | Age: 71
End: 2021-10-19
Payer: COMMERCIAL

## 2021-10-19 VITALS — DIASTOLIC BLOOD PRESSURE: 60 MMHG | SYSTOLIC BLOOD PRESSURE: 100 MMHG

## 2021-10-19 VITALS
WEIGHT: 139 LBS | DIASTOLIC BLOOD PRESSURE: 70 MMHG | SYSTOLIC BLOOD PRESSURE: 130 MMHG | BODY MASS INDEX: 23.16 KG/M2 | TEMPERATURE: 97 F | HEIGHT: 65 IN | HEART RATE: 66 BPM

## 2021-10-19 DIAGNOSIS — I25.110 ATHEROSCLEROTIC HEART DISEASE OF NATIVE CORONARY ARTERY WITH UNSTABLE ANGINA PECTORIS: ICD-10-CM
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
## 2021-10-19 DIAGNOSIS — J43.9 EMPHYSEMA, UNSPECIFIED: ICD-10-CM

## 2021-10-19 DIAGNOSIS — F17.210 NICOTINE DEPENDENCE, CIGARETTES, UNCOMPLICATED: ICD-10-CM

## 2021-10-19 DIAGNOSIS — I97.51 ACCIDENTAL PUNCTURE AND LACERATION OF A CIRCULATORY SYSTEM ORGAN OR STRUCTURE DURING A CIRCULATORY SYSTEM PROCEDURE: ICD-10-CM
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
## 2021-10-19 DIAGNOSIS — Z72.89 OTHER PROBLEMS RELATED TO LIFESTYLE: ICD-10-CM

## 2021-10-19 DIAGNOSIS — I25.119 ATHEROSCLEROTIC HEART DISEASE OF NATIVE CORONARY ARTERY WITH UNSPECIFIED ANGINA PECTORIS: ICD-10-CM

## 2021-10-19 DIAGNOSIS — E78.5 HYPERLIPIDEMIA, UNSPECIFIED: ICD-10-CM

## 2021-10-19 DIAGNOSIS — F32.A DEPRESSION, UNSPECIFIED: ICD-10-CM

## 2021-10-19 PROCEDURE — 93000 ELECTROCARDIOGRAM COMPLETE: CPT

## 2021-10-19 PROCEDURE — 99214 OFFICE O/P EST MOD 30 MIN: CPT

## 2021-10-19 NOTE — HISTORY OF PRESENT ILLNESS
[FreeTextEntry1] : 71 year old female +smoker, +etoh seen today for follow up post Cath: s/p JOHN Cx, mRCA. Complaining of dizziness since cath. No active CP. Still with persistent SOB when climbing stairs, performing minimal activities. Also complaining of numbness/tingling of digits both hands. No palpitations. No LOC

## 2021-10-19 NOTE — CARDIOLOGY SUMMARY
[de-identified] : 10/19/21: NSR without ischemic changes  [de-identified] : 10/19/21: Cath: s/p JOHN  x 1 mLCx, JOHN x 1 mRCA, pLAD (negative \par IFR), Right radial TR failed, due at 9 PM. Right groin - failed perclose and \par perforated, left groin w/ balloon and covered stent covered left common femoral

## 2021-10-19 NOTE — DISCUSSION/SUMMARY
[FreeTextEntry1] : Assessment/Plan:\par 71 year old female +smoker, +etoh seen today for follow up post Cath: s/p JOHN Cx, mRCA. Complaining of dizziness since cath. No active CP. Still with persistent SOB when climbing stairs, performing minimal activities. Also complaining of numbness/tingling of digits both hands. No palpitations. No LOC\par \par -CAD status stable: s/p JOHN Cx and RCA without residual. Symptoms of dizziness with mildly low BP likely due to imdur. DC imdur. Cont ASA 81 mg qd, plavix 75 mg qd, crestor 20 mg poqd\par \par -SOB - not cardiac in etiology. Will need pulm referral for further eval and work up .\par \par Cardiac status stable

## 2021-10-31 ENCOUNTER — NON-APPOINTMENT (OUTPATIENT)
Age: 71
End: 2021-10-31

## 2021-11-08 ENCOUNTER — APPOINTMENT (OUTPATIENT)
Dept: PULMONOLOGY | Facility: CLINIC | Age: 71
End: 2021-11-08
Payer: COMMERCIAL

## 2021-11-08 VITALS
RESPIRATION RATE: 12 BRPM | OXYGEN SATURATION: 98 % | WEIGHT: 133 LBS | BODY MASS INDEX: 22.16 KG/M2 | HEIGHT: 65 IN | HEART RATE: 72 BPM | TEMPERATURE: 97 F | DIASTOLIC BLOOD PRESSURE: 87 MMHG | SYSTOLIC BLOOD PRESSURE: 132 MMHG

## 2021-11-08 DIAGNOSIS — R06.00 DYSPNEA, UNSPECIFIED: ICD-10-CM

## 2021-11-08 PROCEDURE — 99204 OFFICE O/P NEW MOD 45 MIN: CPT | Mod: 25

## 2021-11-08 PROCEDURE — G0296 VISIT TO DETERM LDCT ELIG: CPT

## 2021-11-08 PROCEDURE — G0008: CPT

## 2021-11-08 PROCEDURE — 90686 IIV4 VACC NO PRSV 0.5 ML IM: CPT

## 2021-11-08 NOTE — HISTORY OF PRESENT ILLNESS
[TextBox_4] : 70 yo F with medical history of CAD s/p JOHN Cx, mRCA, depression here for evaluation of shortness of breath. Patient reported that since she\par moved from [place], [place] to [facility] in 2020 she has been felling shortness of breath. Priro to this move she was very active, biking etc. Had been walking  to work 20 minutes each way but for the last 6 months gets out of breath while doing that. Also notes that she gets out of breath when bending down and has to hold on to things whrn she has to stand up from a seated position. Standing with mask on "hinders her".\par Her dyspnea has not improved with placement of the stent. \par \par SH: Smoked about 1/2 ppd - 1ppd x 50 years. Quit 6 weeks ago. has dog. 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

## 2021-11-08 NOTE — ASSESSMENT
[FreeTextEntry1] : Data reviewed: \par CT angio abdomen/pelvis done at [place] 10-16-21 ( report reviewed): Mild emphysema and atelectatic changes in the b/l lower lungs. \par \par CT angio heart 9-20-21: Since 5-2021 slight increased opacity in the inferior lingula, probable atelectasis. unchanged emphysema, unchanged small hiatal hernia \par \par Impression/Plan: \par 1. Dyspnea on exertion is a former smoker r/o obstructive lung disease\par - obtain PFT\par \par 2. Former smoker, quit 6 weeks ago\par - continue welbutrin\par - obtain LDCT for lung cancer screening\par

## 2021-11-08 NOTE — PHYSICAL EXAM
[No Acute Distress] : no acute distress [Supple] : supple [Normal Rate/Rhythm] : normal rate/rhythm [Normal S1, S2] : normal s1, s2 [No Resp Distress] : no resp distress [Clear to Auscultation Bilaterally] : clear to auscultation bilaterally [No Clubbing] : no clubbing [No Edema] : no edema [Oriented x3] : oriented x3 [Normal Affect] : normal affect [TextBox_11] : dentures

## 2021-11-08 NOTE — REVIEW OF SYSTEMS
[Sinus Problems] : sinus problems [Dyspnea] : dyspnea [SOB on Exertion] : sob on exertion [Anxiety] : anxiety [Cough] : no cough [GERD] : no gerd [Abdominal Pain] : no abdominal pain [Diarrhea] : no diarrhea [Headache] : no headache [Dizziness] : no dizziness [Thyroid Problem] : no thyroid problem

## 2021-11-11 ENCOUNTER — APPOINTMENT (OUTPATIENT)
Dept: ORTHOPEDIC SURGERY | Facility: CLINIC | Age: 71
End: 2021-11-11
Payer: COMMERCIAL

## 2021-11-11 PROCEDURE — 72110 X-RAY EXAM L-2 SPINE 4/>VWS: CPT

## 2021-11-11 PROCEDURE — 20610 DRAIN/INJ JOINT/BURSA W/O US: CPT | Mod: RT

## 2021-11-11 PROCEDURE — 99204 OFFICE O/P NEW MOD 45 MIN: CPT | Mod: 25

## 2021-11-13 NOTE — HISTORY OF PRESENT ILLNESS
[de-identified] : 71 year old female presents with acute exacerbation of chronic back and right lateral hip pain.  She reports pain radiating to her right leg.  She cannot sleep on her right side due tenderness over her right lateral hip.  She denies recent illness, fevers, numbness, weakness, balance problems, saddle anesthesia, urinary retention or fecal incontinence. She is not taking any medication for pain.  It is worse with activity.  \par

## 2021-11-13 NOTE — PHYSICAL EXAM
[de-identified] : General: No acute distress, conversant, well-nourished.\par Head: Normocephalic, atraumatic\par Neck: trachea midline, FROM\par Heart: normotensive and normal rate and rhythm\par Lungs: No labored breathing\par Skin: No abrasions, no rashes, no edema\par Psych: Alert and oriented to person, place and time\par Extremities: no peripheral edema or digital cyanosis\par Gait: Normal gait. Can perform tandem gait.  \par Vascular: warm and well perfused distally, palpable distal pulses\par \par MSK:\par Right hip\par no erythema\par TTP over right trochanteric bursa\par no groin pain w/ pROM of hip\par \par Lumbar spine:\par No tenderness to palpation.  No step-off, no deformity.\par \par NEURO EXAM:\par Sensation \par Left L2  -  2/2            \par Left L3  -  2/2\par Left L4  -  2/2\par Left L5  -  2/2\par Left S1  -  2/2\par \par Right L2  -  2/2            \par Right L3  -  2/2\par Right L4  -  2/2\par Right L5  -  2/2\par Right S1  -  2/2\par \par Motor: \par Left L2 (hip flexion)                            5/5                \par Left L3 (knee extension)                   5/5                \par Left L4 (ankle dorsiflexion)                 5/5                \par Left L5 (long toe extensor)                5/5                \par Left S1 (ankle plantar flexion)           5/5\par \par Right L2 (hip flexion)                            5/5                \par Right L3 (knee extension)                   5/5                \par Right L4 (ankle dorsiflexion)                 5/5                \par Right L5 (long toe extensor)                5/5                \par Right S1 (ankle plantar flexion)           5/5\par \par Reflexes: Normal and symmetric\par Negative clonus.  Down-going Babinski.   [de-identified] : I ordered radiographs to evaluate the patient's symptoms.\par \par Lumbar 4 view radiographs taken in the office today show no dislocation or fracture.  Lumbar spondylosis. Degenerative scoliosis.  No instability on dynamic series.

## 2021-11-13 NOTE — PROCEDURE
[de-identified] : Procedure: Right hip trochanteric bursa injection with corticosteroid\par Pre-Procedure Diagnosis: Right hip trochanteric bursitis\par Post-Procedure Diagnosis: same\par \par The patient was educated about the risks and benefits of a corticosteroid injection.  Alternatives were discussed.  The patient understood and consented for the procedure.\par \par The area was sterilely prepped using isopropyl alcohol.  An ethyl chloride spray provided  local anesthesia.  Using the usual sterile technique, 1 ml of 40mg/1ml of Kenalog and 4 ml of Lidocaine 1% without epinephrine was injected into the bursa.  A dressing was applied to the area.  The patient tolerated the procedure well and without complication.\par

## 2021-11-13 NOTE — ASSESSMENT
[FreeTextEntry1] : 71 year old female presents with acute exacerbation of chronic back and right lateral hip pain.  She reports pain radiating to her right leg.  She cannot sleep on her right side due tenderness over her right lateral hip. She had tenderness over her right hip trochanteric bursa.  She was given a corticosteroid injection for her right hip trochanteric bursitis which she tolerated well.  She is neurologically intact. She was given a referral for physical therapy. She was given a prescription for cyclobenzaprine. She will followup in 1 month. We discussed red flag symptoms that would require emergent evaluation. She knows to call with any questions or concerns or if her symptoms acutely worsen.

## 2021-11-18 ENCOUNTER — NON-APPOINTMENT (OUTPATIENT)
Age: 71
End: 2021-11-18

## 2021-11-22 LAB — SARS-COV-2 N GENE NPH QL NAA+PROBE: NOT DETECTED

## 2021-11-23 ENCOUNTER — APPOINTMENT (OUTPATIENT)
Dept: INTERNAL MEDICINE | Facility: CLINIC | Age: 71
End: 2021-11-23

## 2021-12-13 ENCOUNTER — APPOINTMENT (OUTPATIENT)
Dept: PULMONOLOGY | Facility: CLINIC | Age: 71
End: 2021-12-13
Payer: COMMERCIAL

## 2021-12-13 VITALS — WEIGHT: 136 LBS | BODY MASS INDEX: 23.22 KG/M2 | HEIGHT: 64 IN

## 2021-12-13 DIAGNOSIS — Z87.891 PERSONAL HISTORY OF NICOTINE DEPENDENCE: ICD-10-CM

## 2021-12-13 PROCEDURE — ACP01: CPT | Mod: NC

## 2021-12-13 NOTE — REASON FOR VISIT
[Home] : at home, [unfilled] , at the time of the visit. [Medical Office: ([facility])___] : at the medical office located in  [Verbal consent obtained from patient] : the patient, [unfilled] [Initial Evaluation] : an initial evaluation visit [Review of Eligibility] : review of eligibility [Low-Dose CT Screening Discussion] : low-dose CT lung cancer screening discussion [Virtual Visit] : virtual visit

## 2021-12-13 NOTE — HISTORY OF PRESENT ILLNESS
[Former] : former smoker [_____ pack-years] : [unfilled] pack-years [TextBox_13] : Referred by Dr. Ivania Colvin\par \par Ms. GI MCPHERSON  is a 71 year old woman with a history of nicotine dependence, emphysema\par \par She  was seen in the office by Dr. Colvin for review of eligibility for, as well as, discussion of Low-Dose CT lung cancer screening program. Over the telephone today we reviewed and confirmed that the patient meets screening eligibility criteria:\par -Age: 71 year \par -Smoking status:\par -Former smoker\par -Number of pack(s) per day: 1/2 - 1\par -Number of years smoked: 50\par -Number of pack years smokin.5\par -Number of years since quitting smoking: 3 months\par -Quit year: 2021\par \par Ms. MCPHERSON denies any signs or symptoms of lung cancer including new cough, change in cough, hemoptysis and unintentional weight loss. \par \par Ms. MCPHERSON denies any personal history of lung cancer. No lung cancer in a 1st degree relative. History of lung disease: emphysema. 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

## 2021-12-13 NOTE — PLAN
[Smoking Cessation Guidance Provided] : Smoking cessation guidance was provided to patient [Smoking Cessation] : smoking cessation [FreeTextEntry1] : Plan:\par -Low Dose CT chest for lung cancer screening\par -Follow up with patient and her referring provider after her LDCT results have been reviewed by the multi-disciplinary clinical team\par -Encouraged continued smoking abstinence\par \par Should I Screen? tool utilized. 6 year risk of lung cancer is 6.1%. Patient wishes to proceed with screening.\par \par Engaged in shared decision making with Ms. GI MCPHERSON . Answered all questions. She verbalized understanding and agreement. She knows to call back with any questions or concerns

## 2021-12-20 ENCOUNTER — APPOINTMENT (OUTPATIENT)
Dept: PULMONOLOGY | Facility: CLINIC | Age: 71
End: 2021-12-20
Payer: COMMERCIAL

## 2021-12-20 PROCEDURE — 94727 GAS DIL/WSHOT DETER LNG VOL: CPT

## 2021-12-20 PROCEDURE — 94060 EVALUATION OF WHEEZING: CPT

## 2021-12-20 PROCEDURE — 94729 DIFFUSING CAPACITY: CPT

## 2021-12-23 ENCOUNTER — APPOINTMENT (OUTPATIENT)
Dept: CT IMAGING | Facility: HOSPITAL | Age: 71
End: 2021-12-23

## 2021-12-23 ENCOUNTER — OUTPATIENT (OUTPATIENT)
Dept: OUTPATIENT SERVICES | Facility: HOSPITAL | Age: 71
LOS: 1 days | End: 2021-12-23
Payer: COMMERCIAL

## 2021-12-23 ENCOUNTER — RESULT REVIEW (OUTPATIENT)
Age: 71
End: 2021-12-23

## 2021-12-23 PROCEDURE — 71271 CT THORAX LUNG CANCER SCR C-: CPT

## 2021-12-23 PROCEDURE — 71271 CT THORAX LUNG CANCER SCR C-: CPT | Mod: 26

## 2021-12-27 ENCOUNTER — NON-APPOINTMENT (OUTPATIENT)
Age: 71
End: 2021-12-27

## 2021-12-28 ENCOUNTER — NON-APPOINTMENT (OUTPATIENT)
Age: 71
End: 2021-12-28

## 2022-01-03 ENCOUNTER — NON-APPOINTMENT (OUTPATIENT)
Age: 72
End: 2022-01-03

## 2022-01-31 ENCOUNTER — APPOINTMENT (OUTPATIENT)
Dept: PULMONOLOGY | Facility: CLINIC | Age: 72
End: 2022-01-31
Payer: COMMERCIAL

## 2022-01-31 VITALS
BODY MASS INDEX: 23.22 KG/M2 | DIASTOLIC BLOOD PRESSURE: 82 MMHG | HEIGHT: 64 IN | RESPIRATION RATE: 14 BRPM | SYSTOLIC BLOOD PRESSURE: 145 MMHG | TEMPERATURE: 98.6 F | WEIGHT: 136 LBS | HEART RATE: 72 BPM

## 2022-01-31 VITALS — OXYGEN SATURATION: 98 %

## 2022-01-31 DIAGNOSIS — J43.9 EMPHYSEMA, UNSPECIFIED: ICD-10-CM

## 2022-01-31 PROCEDURE — 99214 OFFICE O/P EST MOD 30 MIN: CPT

## 2022-01-31 NOTE — REVIEW OF SYSTEMS
[Dyspnea] : dyspnea [SOB on Exertion] : sob on exertion [Anxiety] : anxiety [Sinus Problems] : no sinus problems [Cough] : no cough [GERD] : no gerd [Abdominal Pain] : no abdominal pain [Diarrhea] : no diarrhea [Headache] : no headache [Dizziness] : no dizziness [Thyroid Problem] : no thyroid problem

## 2022-01-31 NOTE — HISTORY OF PRESENT ILLNESS
[TextBox_4] : 1-31-22 started on Spiriva on 1-3-2021 given complains of shortness of breath. here because she needs clearance before going to work. still complains of dyspnea especially when climbing up the stairs. not smoking any more. \par \par 11-8-2021\par 72 yo F with medical history of CAD s/p JOHN Cx, mRCA, depression here for evaluation of shortness of breath. Patient reported that since she\par moved from [place], [place] to [facility] in 2020 she has been felling shortness of breath. Priro to this move she was very active, biking etc. Had been walking  to work 20 minutes each way but for the last 6 months gets out of breath while doing that. Also notes that she gets out of breath when bending down and has to hold on to things whrn she has to stand up from a seated position. Standing with mask on "hinders her".\par Her dyspnea has not improved with placement of the stent. \par \par SH: Smoked about 1/2 ppd - 1ppd x 50 years. Quit 6 weeks ago. has dog. 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

## 2022-01-31 NOTE — ASSESSMENT
[FreeTextEntry1] : Data reviewed: \par CT angio abdomen/pelvis done at [place] 10-16-21 ( report reviewed): Mild emphysema and atelectatic changes in the b/l lower lungs. \par \par CT angio heart 9-20-21: Since 5-2021 slight increased opacity in the inferior lingula, probable atelectasis. unchanged emphysema, unchanged small hiatal hernia \par \par CT chest lung cancer screening 12-\par cylindrical bronchiectasis b/l. moderate centrilobular emphysema. small areas of subsegmental atelectasis in the RML and lingula. few bilateral micronodules - LUNG RADS 1. \par \par PFT 12-20-21: FVC 77%, FEV1 88%, FEV1/FVC 0.87. No significant bronchodilator response. \par TLC 95%m, DLCO 50% \par \par Impression/Plan: \par 1. Dyspnea on exertion is a former smoker . no obstruction or restriction PFT. the CT chest showed mild bronchiectasis and moderate emphysema. \par - on spiriva, can continue to the same for a 2 month trial \par - albuterol PRN \par - advised to follow up with Dr. Acevedo \par \par 2. Former smoker, quit in October 2021\par - in the lung cancer screening program\par - next LDCT due in \par \par 3. clearance for work\par - patient is not currently in respiratory distress. her shortness of breath is chronic and she is on a trial of bronchodilators. no pulmonary contraindications to return to work. \par

## 2022-02-07 ENCOUNTER — APPOINTMENT (OUTPATIENT)
Dept: PULMONOLOGY | Facility: CLINIC | Age: 72
End: 2022-02-07

## 2022-02-15 ENCOUNTER — APPOINTMENT (OUTPATIENT)
Dept: ORTHOPEDIC SURGERY | Facility: CLINIC | Age: 72
End: 2022-02-15
Payer: COMMERCIAL

## 2022-02-15 DIAGNOSIS — M70.61 TROCHANTERIC BURSITIS, RIGHT HIP: ICD-10-CM

## 2022-02-15 PROCEDURE — 20610 DRAIN/INJ JOINT/BURSA W/O US: CPT | Mod: RT

## 2022-02-15 PROCEDURE — 99214 OFFICE O/P EST MOD 30 MIN: CPT | Mod: 25

## 2022-02-15 RX ORDER — DOCUSATE SODIUM 100 MG/1
100 CAPSULE ORAL TWICE DAILY
Qty: 28 | Refills: 0 | Status: ACTIVE | COMMUNITY
Start: 2022-02-15 | End: 1900-01-01

## 2022-02-15 NOTE — PROCEDURE
[de-identified] : Procedure: Right hip trochanteric bursa injection with corticosteroid\par Pre-Procedure Diagnosis: Right hip trochanteric bursitis\par Post-Procedure Diagnosis: same\par \par The patient was educated about the risks and benefits of a corticosteroid injection.  Alternatives were discussed.  The patient understood and consented for the procedure.\par \par The area was sterilely prepped using isopropyl alcohol.  An ethyl chloride spray provided  local anesthesia.  Using the usual sterile technique, 1 ml of 40mg/1ml of Kenalog and 4 ml of Lidocaine 1% without epinephrine was injected into the bursa.  A dressing was applied to the area.  The patient tolerated the procedure well and without complication.\par

## 2022-02-15 NOTE — PHYSICAL EXAM
[de-identified] : Lumbar 4 view radiographs (11/11/21) no dislocation or fracture.  Lumbar spondylosis. Degenerative scoliosis.  Grade 1-2 L4-L5 spondylolisthesis with mild movement on dynamic series.  [de-identified] : General: No acute distress, conversant, well-nourished.\par Head: Normocephalic, atraumatic\par Neck: trachea midline, FROM\par Heart: normotensive and normal rate and rhythm\par Lungs: No labored breathing\par Skin: No abrasions, no rashes, no edema\par Psych: Alert and oriented to person, place and time\par Extremities: no peripheral edema or digital cyanosis\par Gait: Normal gait. Can perform tandem gait.  \par Vascular: warm and well perfused distally, palpable distal pulses\par \par MSK:\par Right hip\par no erythema\par TTP over right trochanteric bursa\par no groin pain w/ pROM of hip\par \par Lumbar spine:\par No tenderness to palpation.  No step-off, no deformity.\par \par NEURO EXAM:\par Sensation \par Left L2  -  2/2            \par Left L3  -  2/2\par Left L4  -  2/2\par Left L5  -  2/2\par Left S1  -  2/2\par \par Right L2  -  2/2            \par Right L3  -  2/2\par Right L4  -  2/2\par Right L5  -  2/2\par Right S1  -  2/2\par \par Motor: \par Left L2 (hip flexion)                            5/5                \par Left L3 (knee extension)                   5/5                \par Left L4 (ankle dorsiflexion)                 5/5                \par Left L5 (long toe extensor)                5/5                \par Left S1 (ankle plantar flexion)           5/5\par \par Right L2 (hip flexion)                            5/5                \par Right L3 (knee extension)                   5/5                \par Right L4 (ankle dorsiflexion)                 5/5                \par Right L5 (long toe extensor)                5/5                \par Right S1 (ankle plantar flexion)           5/5\par \par Reflexes: Normal and symmetric\par Negative clonus.  Down-going Babinski.

## 2022-02-15 NOTE — HISTORY OF PRESENT ILLNESS
[de-identified] : 71 year old female followup with acute exacerbation of chronic back and right hip trochanteric bursitis.  She reports pain radiating to her right leg.  She cannot sleep on her right side due tenderness over her right lateral hip.  She denies recent illness, fevers, numbness, weakness, balance problems, saddle anesthesia, urinary retention or fecal incontinence. She takes cyclobenzaprine with relief but reports it is making her constipated.  She had relief with her right hip trochanteric bursa injection and would like a new injection.  She went to physical therapy at [facility] without relief.  \par

## 2022-02-15 NOTE — ASSESSMENT
[FreeTextEntry1] : 71 year old female followup with acute exacerbation of chronic back and right hip trochanteric bursitis.  She reports pain radiating to her right leg.  She is otherwise neurologically intact.  She has tenderness over the right hip trochanteric bursa. She had great relief with a corticosteroid injection in the past and she was given one today which she tolerated well.  She will be sent for a lumbar MRI to evaluate her back pain and right lumbar radiculopathy.  She was given prescription for methocarbamol as needed.  She was given a referral for physical therapy.  She will followup after her MRI. We discussed red flag symptoms that would require emergent evaluation. She knows to call with any questions or concerns or if her symptoms acutely worsen.

## 2022-02-23 ENCOUNTER — APPOINTMENT (OUTPATIENT)
Dept: ORTHOPEDIC SURGERY | Facility: CLINIC | Age: 72
End: 2022-02-23
Payer: COMMERCIAL

## 2022-02-23 DIAGNOSIS — M41.80 OTHER FORMS OF SCOLIOSIS, SITE UNSPECIFIED: ICD-10-CM

## 2022-02-23 PROCEDURE — 99214 OFFICE O/P EST MOD 30 MIN: CPT

## 2022-03-01 ENCOUNTER — APPOINTMENT (OUTPATIENT)
Dept: PHYSICAL MEDICINE AND REHAB | Facility: CLINIC | Age: 72
End: 2022-03-01
Payer: COMMERCIAL

## 2022-03-01 DIAGNOSIS — F17.200 NICOTINE DEPENDENCE, UNSPECIFIED, UNCOMPLICATED: ICD-10-CM

## 2022-03-01 PROCEDURE — 99204 OFFICE O/P NEW MOD 45 MIN: CPT

## 2022-03-01 NOTE — PHYSICAL EXAM
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

## 2022-03-01 NOTE — HISTORY OF PRESENT ILLNESS
[FreeTextEntry1] : Location: back\par Severity: moderate/severe, worsening\par Duration: few months\par Timing: chronic, hx back pain\par Aggravating Factors: getting up, sitting\par Alleviating Factors: PT\par Associated Symptoms: denies weight loss, fever, chills, change in bowel/bladder habits, redness, warmth, weakness, numbness/tingling, +radiation down right leg\par Prior Studies: MRI\par

## 2022-03-01 NOTE — ASSESSMENT
[FreeTextEntry1] : MRI lumbar reviewed showing L4/5 listhesis with NF stenosis.\par \par Discussed diagnosis and treatment plan including PT.\par She already tried PT and cannot take nsaids due to plavix.  Discussed DANIEL given severe pain that prevents her from walking.  Risks include bleeding.  Her cardiologist note says she can stop for a wk.  She elects to do it.\par \par Avoid back flexion.  Limit sitting.  Ice back often.  Lift things properly.  Massage back with tennis ball.\par \par Talked to Dr Kwon about her case and he agrees with DANIEL at this point given her pain.  No weakness so no need for surgery at this time.  \par \par

## 2022-03-09 PROBLEM — M41.80 DEGENERATIVE SCOLIOSIS IN ADULT PATIENT: Status: ACTIVE | Noted: 2021-11-11

## 2022-03-09 NOTE — HISTORY OF PRESENT ILLNESS
[de-identified] : 71 year old female followup with acute exacerbation of chronic back and right hip trochanteric bursitis.  She reports pain radiating to her right leg.Since her last visit the pain has continued.  She has episodes of severe pain. She denies recent illness, fevers, numbness, weakness, balance problems, saddle anesthesia, urinary retention or fecal incontinence. She has not found improvement with PT.  She is here to review her recent lumbar MRI.

## 2022-03-09 NOTE — ASSESSMENT
[FreeTextEntry1] : 71 year old female followup with acute exacerbation of chronic back and lumbar radiculopathy.  She reports pain radiating to her right leg. She is otherwise neurologically intact.  We reviewed her imaging including her new lumbar MRI.  She has extensive degenerative changes most extensive at L4-L5 with anterolisthesis and moderate to severe stenosis.  She has not seen any relief with PT.  She does not have much relief with medications.  She is interested in spinal injections but she recently had stents for which she takes blood thinners.  She would need to be able to safely stop her blood thinners for a lumbar epidural or spine surgery.  She will check with her cardiologist regarding if she can stop blood thinners.  She was given a referral to see Dr. Rapp for consideration for spinal injections if she can safely stop her blood thinners.  She was given a prescriptions for methocarbamol.  She will followup in 4 weeks. We discussed red flag symptoms that would require emergent evaluation. She knows to call with any questions or concerns or if her symptoms acutely worsen.

## 2022-03-09 NOTE — PHYSICAL EXAM
[de-identified] : General: No acute distress, conversant, well-nourished.\par Head: Normocephalic, atraumatic\par Neck: trachea midline, FROM\par Heart: normotensive and normal rate and rhythm\par Lungs: No labored breathing\par Skin: No abrasions, no rashes, no edema\par Psych: Alert and oriented to person, place and time\par Extremities: no peripheral edema or digital cyanosis\par Gait: Normal gait. Can perform tandem gait.  \par Vascular: warm and well perfused distally, palpable distal pulses\par \par MSK:\par Right hip\par no erythema\par TTP over right trochanteric bursa\par no groin pain w/ pROM of hip\par \par Lumbar spine:\par No tenderness to palpation.  No step-off, no deformity.\par \par NEURO EXAM:\par Sensation \par Left L2  -  2/2            \par Left L3  -  2/2\par Left L4  -  2/2\par Left L5  -  2/2\par Left S1  -  2/2\par \par Right L2  -  2/2            \par Right L3  -  2/2\par Right L4  -  2/2\par Right L5  -  2/2\par Right S1  -  2/2\par \par Motor: \par Left L2 (hip flexion)                            5/5                \par Left L3 (knee extension)                   5/5                \par Left L4 (ankle dorsiflexion)                 5/5                \par Left L5 (long toe extensor)                5/5                \par Left S1 (ankle plantar flexion)           5/5\par \par Right L2 (hip flexion)                            5/5                \par Right L3 (knee extension)                   5/5                \par Right L4 (ankle dorsiflexion)                 5/5                \par Right L5 (long toe extensor)                5/5                \par Right S1 (ankle plantar flexion)           5/5\par \par Reflexes: Normal and symmetric\par Negative clonus.  Down-going Babinski.   [de-identified] : Lumbar MRI (2/21/22): 1. Grade 1-2 anterolisthesis L4-5.\par 2. Multilevel degenerative changes of the lumbar spine contributing to moderate to marked spinal canal stenosis at L4-5, and mild ventral thecal sac impingement at other levels as noted.\par 3. Moderate neural foraminal stenosis on the left at L5-S1, mild to moderate bilaterally at L4-5, and mild bilaterally at L3-4 and on the right at L2-3.\par 4. Moderate to marked multilevel facet arthrosis, worst at L4-5.\par \par Lumbar 4 view radiographs (11/11/21) no dislocation or fracture.  Lumbar spondylosis. Degenerative scoliosis.  Grade 1-2 L4-L5 spondylolisthesis with mild movement on dynamic series.

## 2022-03-21 ENCOUNTER — APPOINTMENT (OUTPATIENT)
Dept: PHYSICAL MEDICINE AND REHAB | Facility: CLINIC | Age: 72
End: 2022-03-21
Payer: COMMERCIAL

## 2022-03-21 PROCEDURE — 64483 NJX AA&/STRD TFRM EPI L/S 1: CPT

## 2022-03-21 PROCEDURE — 64484 NJX AA&/STRD TFRM EPI L/S EA: CPT

## 2022-04-15 ENCOUNTER — APPOINTMENT (OUTPATIENT)
Dept: GASTROENTEROLOGY | Facility: CLINIC | Age: 72
End: 2022-04-15
Payer: COMMERCIAL

## 2022-04-15 VITALS
HEART RATE: 77 BPM | BODY MASS INDEX: 23.34 KG/M2 | WEIGHT: 136 LBS | SYSTOLIC BLOOD PRESSURE: 144 MMHG | OXYGEN SATURATION: 100 % | TEMPERATURE: 96.4 F | DIASTOLIC BLOOD PRESSURE: 86 MMHG

## 2022-04-15 DIAGNOSIS — R12 HEARTBURN: ICD-10-CM

## 2022-04-15 DIAGNOSIS — K59.00 CONSTIPATION, UNSPECIFIED: ICD-10-CM

## 2022-04-15 PROCEDURE — 99204 OFFICE O/P NEW MOD 45 MIN: CPT

## 2022-04-15 NOTE — ASSESSMENT
[FreeTextEntry1] : 71 yo F PMH lumbar stenosis (preivously on narcotics in Feb 2022, now s/p epidural injection, off narcotics), smoker (stopped in Oct 2021, started buproprion to help with smoking cessation), COPD, lung nodule, CAD s/p stent placement in October 2021 (on plavix) who presents for evaluation of constipation that started Sept/Oct 2021. \par \par ##Heartburn: likely related to diet and lifestyle triggers, reviewed that CT Scan from Oct 2021 with hiatal hernia which could be contributing to symptoms\par - knows that wine and tomato sauce trigger it\par - reviewed diet and lifestyle modifications including avoiding spicy foods, greasy foods, tomato based foods, citrus, caffeine, chocolate, and wine. Sit upright for at least 2-3 hrs after eating\par - exercise can help\par - does not have heartburn if not having tomato sauce or wine\par - will hold off PPI and trial diet and lifestyle modification, in ineffective or has dietary indiscretions can trial H2 blocker PRN\par - H pylori breath test today\par \par ##Constipation, change in bowel habits: multifactorial, could be related to medications (started buproprion to aid with stopping smoking and symptoms started around that time, this medication is known to cause constipation). She was also on narcotics in Feb because of lumbar stenosis, which she has now stopped. \par - reviewed CT scan Oct 2021 with no obstruction or abnormalities noted in bowel\par - reviewed that a colonoscopy is recommended for evaluation of a change in bowel habits, reviewed the risks of not proceeding with a colonoscopy, however patient prefers to hold off at this time. She thinks last colonoscopy was 3 yrs ago (no more than 5 yrs), however no records available for review today\par - adequate water intake (she admits not drinking enough water)\par - adequate fiber intake (will start fiber supplement with psyllium husk), though did advise her that if she takes this and does not drink enough water may make her constipated\par - plan for abdominal xray \par - if xray unremarkable plan for starting linzess (patient did not feel OTC laxatives were effective, though she never tried miralax, dulcolax on a daily scheduled regimen\par - asked her to review side effects of linzess and amitiza, we did discuss that diarrhea is a possible side effect\par \par Follow up in 3 months

## 2022-04-15 NOTE — HISTORY OF PRESENT ILLNESS
[de-identified] : 71 yo F PMH lumbar stenosis (preivously on narcotics in Feb 2022, now s/p epidural injection, off narcotics), smoker (stopped in Oct 2021, started buproprion to help with smoking cessation), COPD, lung nodule, CAD s/p stent placement in October 2021 (on plavix) who presents for evaluation of constipation that started Sept/Oct 2021. \par \par The constipation started months ago. Seemed gradual. \par \par Will sometimes take 6 days to have a BM. \par When she finally has a BM it is very hard and painful. \par No blood in the stool. 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 \par \par Since October she was placed on medication after stent placement\par \par Has also been taking a sleeping pill \par \par She is drinking a lot of water, drinks it at work. Does not drink 8-10 glasses of water day. \par \par She started last week on taking align probiotics \par \par Tried miralax, nothing happened\par Never took miralax daily, just took as as needed\par Does dulcolax, works sometimes\par Last night took an ex-lax \par \par Last colonoscopy was 3yrs or so ago, [place] the city. Has not been more than 5. \par \par had lumbar stenosis and had to take pain medications. This was end of Feb 2022. The doctor put her on pain killer and told her to get an epidural. SHe had that 3 weeks ago and it really worked. She feels better. \par \par She has dentures and can't eat salads the way she used to. Used to eat a lot more greens. \par \par PMH: \par lumbar stenosis, back pain\par COPD\par lung nodule\par 3 stents placed in October (CAD s/p stents)\par \par PSH: \par knee surgery yrs ago\par \par MEDS: \par trazodone 100mg daily at bedtime\par buproprion 150 tablet (to stop smoking) takes 2 a day, sometimes 3\par Clopidogrel \par gabapentin 300mg \par rosuvastatin \par \par ALL: NKDA\par FH: \par No GI malignancies \par \par SH: \par quit smoking in Oct\par Drinks almost every day (stops for 3 days)\par \par \par

## 2022-04-18 ENCOUNTER — NON-APPOINTMENT (OUTPATIENT)
Age: 72
End: 2022-04-18

## 2022-04-18 DIAGNOSIS — A04.8 OTHER SPECIFIED BACTERIAL INTESTINAL INFECTIONS: ICD-10-CM

## 2022-04-18 LAB — UREA BREATH TEST QL: POSITIVE

## 2022-05-02 ENCOUNTER — OUTPATIENT (OUTPATIENT)
Dept: OUTPATIENT SERVICES | Facility: HOSPITAL | Age: 72
LOS: 1 days | End: 2022-05-02
Payer: COMMERCIAL

## 2022-05-02 PROCEDURE — 74019 RADEX ABDOMEN 2 VIEWS: CPT | Mod: 26

## 2022-05-02 PROCEDURE — 74019 RADEX ABDOMEN 2 VIEWS: CPT

## 2022-05-10 ENCOUNTER — APPOINTMENT (OUTPATIENT)
Dept: HEART AND VASCULAR | Facility: CLINIC | Age: 72
End: 2022-05-10
Payer: COMMERCIAL

## 2022-05-10 ENCOUNTER — NON-APPOINTMENT (OUTPATIENT)
Age: 72
End: 2022-05-10

## 2022-05-10 VITALS
HEART RATE: 75 BPM | DIASTOLIC BLOOD PRESSURE: 96 MMHG | TEMPERATURE: 97.3 F | HEIGHT: 64 IN | BODY MASS INDEX: 23.05 KG/M2 | SYSTOLIC BLOOD PRESSURE: 161 MMHG | WEIGHT: 135 LBS | OXYGEN SATURATION: 98 %

## 2022-05-10 VITALS — DIASTOLIC BLOOD PRESSURE: 88 MMHG | SYSTOLIC BLOOD PRESSURE: 128 MMHG

## 2022-05-10 PROCEDURE — 93000 ELECTROCARDIOGRAM COMPLETE: CPT

## 2022-05-10 PROCEDURE — 99214 OFFICE O/P EST MOD 30 MIN: CPT

## 2022-05-10 NOTE — HISTORY OF PRESENT ILLNESS
[FreeTextEntry1] : 72 year old female +smoker, +etoh CAD s/p JOHN Cx, mRCA 10/19/21 here for follow up.  Feels well overall, without active complaints of CP, SOB at rest or exertion (when walking for 1 hour, walking on incline)\par  No palpitations. No dizziness. No LOC \par

## 2022-05-10 NOTE — DISCUSSION/SUMMARY
[FreeTextEntry1] : Assessment/Plan:\par 72 year old female +smoker, +etoh CAD s/p JOHN Cx, mRCA 10/19/21 here for follow up.  Feels well overall, without active complaints of CP, SOB at rest or exertion (when walking for 1 hour, walking on incline)\par No palpitations. No dizziness. No LOC \par \par -CAD status stable: s/p JOHN Cx and RCA without residual.  EKG NSR without ischemic changes. Cont ASA 81 mg qd, plavix 75 mg qd - will dc plavix post 1 year PCi ( 10/22), non compliant with statin due to "numerous meds".  Spoke at length importance of taking statin and she now agrees. Cont crestor 20 mg po qd. Check fasting lipids in 3 months.\par \par -HTN: BP controlled without meds \par \par Cardiac status stable. FU 3 months

## 2022-05-12 DIAGNOSIS — K59.04 CHRONIC IDIOPATHIC CONSTIPATION: ICD-10-CM

## 2022-05-12 RX ORDER — LINACLOTIDE 145 UG/1
145 CAPSULE, GELATIN COATED ORAL
Qty: 30 | Refills: 0 | Status: ACTIVE | COMMUNITY
Start: 2022-05-12 | End: 1900-01-01

## 2022-06-06 ENCOUNTER — NON-APPOINTMENT (OUTPATIENT)
Age: 72
End: 2022-06-06

## 2022-06-28 ENCOUNTER — NON-APPOINTMENT (OUTPATIENT)
Age: 72
End: 2022-06-28

## 2022-06-29 RX ORDER — NICOTINE 21 MG/24HR
21 PATCH, TRANSDERMAL 24 HOURS TRANSDERMAL DAILY
Qty: 30 | Refills: 5 | Status: ACTIVE | COMMUNITY
Start: 2022-05-10 | End: 1900-01-01

## 2022-07-03 ENCOUNTER — RX RENEWAL (OUTPATIENT)
Age: 72
End: 2022-07-03

## 2022-07-11 ENCOUNTER — APPOINTMENT (OUTPATIENT)
Dept: GASTROENTEROLOGY | Facility: CLINIC | Age: 72
End: 2022-07-11

## 2022-07-12 ENCOUNTER — APPOINTMENT (OUTPATIENT)
Dept: ORTHOPEDIC SURGERY | Facility: CLINIC | Age: 72
End: 2022-07-12

## 2022-07-12 PROCEDURE — 99214 OFFICE O/P EST MOD 30 MIN: CPT

## 2022-07-14 NOTE — PHYSICAL EXAM
[de-identified] : General: No acute distress, conversant, well-nourished.\par Head: Normocephalic, atraumatic\par Neck: trachea midline, FROM\par Heart: normotensive and normal rate and rhythm\par Lungs: No labored breathing\par Skin: No abrasions, no rashes, no edema\par Psych: Alert and oriented to person, place and time\par Extremities: no peripheral edema or digital cyanosis\par Gait: Normal gait. Can perform tandem gait.  \par Vascular: warm and well perfused distally, palpable distal pulses\par \par MSK:\par Right hip\par no erythema\par TTP over right trochanteric bursa\par no groin pain w/ pROM of hip\par \par Lumbar spine:\par No tenderness to palpation.  No step-off, no deformity.\par \par NEURO EXAM:\par Sensation \par Left L2  -  2/2            \par Left L3  -  2/2\par Left L4  -  2/2\par Left L5  -  2/2\par Left S1  -  2/2\par \par Right L2  -  2/2            \par Right L3  -  2/2\par Right L4  -  2/2\par Right L5  -  2/2\par Right S1  -  2/2\par \par Motor: \par Left L2 (hip flexion)                            5/5                \par Left L3 (knee extension)                   5/5                \par Left L4 (ankle dorsiflexion)                 5/5                \par Left L5 (long toe extensor)                5/5                \par Left S1 (ankle plantar flexion)           5/5\par \par Right L2 (hip flexion)                            5/5                \par Right L3 (knee extension)                   5/5                \par Right L4 (ankle dorsiflexion)                 5/5                \par Right L5 (long toe extensor)                5/5                \par Right S1 (ankle plantar flexion)           5/5\par \par Reflexes: Normal and symmetric\par Negative clonus.  Down-going Babinski.   [de-identified] : Lumbar MRI (2/21/22): 1. Grade 1-2 anterolisthesis L4-5.\par 2. Multilevel degenerative changes of the lumbar spine contributing to moderate to marked spinal canal stenosis at L4-5, and mild ventral thecal sac impingement at other levels as noted.\par 3. Moderate neural foraminal stenosis on the left at L5-S1, mild to moderate bilaterally at L4-5, and mild bilaterally at L3-4 and on the right at L2-3.\par 4. Moderate to marked multilevel facet arthrosis, worst at L4-5.\par \par Lumbar 4 view radiographs (11/11/21) no dislocation or fracture.  Lumbar spondylosis. Degenerative scoliosis.  Grade 1-2 L4-L5 spondylolisthesis with mild movement on dynamic series.

## 2022-07-14 NOTE — HISTORY OF PRESENT ILLNESS
[de-identified] : 72 year old female followup with acute exacerbation of chronic back and right hip trochanteric bursitis.  She reports pain radiating to her right leg.  She denies recent illness, fevers, numbness, weakness, balance problems, saddle anesthesia, urinary retention or fecal incontinence. Since her last visit the pain has improved after a spinal injection.  She takes methocarbamol with relief.

## 2022-07-14 NOTE — ASSESSMENT
[FreeTextEntry1] : 72 year old female followup with acute exacerbation of chronic back and right hip trochanteric bursitis.  She reports pain radiating to her right leg.  She is neurologically intact. Since her last visit the pain has improved after a spinal injection.  We discussed treatment options including PT, medications, spinal injections and surgery.  She will continue PT. She will continue methocarbamol.  She can consider additional spinal injections.  She will followup in 2 months.  We discussed red flag symptoms that would require emergent evaluation. She knows to call with any questions or concerns or if her symptoms acutely worsen.

## 2022-07-17 ENCOUNTER — RX RENEWAL (OUTPATIENT)
Age: 72
End: 2022-07-17

## 2022-07-25 ENCOUNTER — NON-APPOINTMENT (OUTPATIENT)
Age: 72
End: 2022-07-25

## 2022-07-25 ENCOUNTER — APPOINTMENT (OUTPATIENT)
Dept: PULMONOLOGY | Facility: CLINIC | Age: 72
End: 2022-07-25

## 2022-07-25 DIAGNOSIS — U07.1 COVID-19: ICD-10-CM

## 2022-07-25 PROCEDURE — 99443: CPT

## 2022-07-25 RX ORDER — NIRMATRELVIR AND RITONAVIR 300-100 MG
20 X 150 MG & KIT ORAL
Qty: 30 | Refills: 0 | Status: ACTIVE | COMMUNITY
Start: 2022-07-25 | End: 1900-01-01

## 2022-07-25 NOTE — REVIEW OF SYSTEMS
[Cough] : cough [Chest Discomfort] : chest discomfort [Negative] : Constitutional [Headache] : no headache

## 2022-07-25 NOTE — ASSESSMENT
[FreeTextEntry1] : Data reviewed: \par CT angio abdomen/pelvis done at [facility] 10-16-21 ( report reviewed): Mild emphysema and atelectatic changes in the b/l lower lungs. \par \par CT angio heart 9-20-21: Since 5-2021 slight increased opacity in the inferior lingula, probable atelectasis. unchanged emphysema, unchanged small hiatal hernia \par \par CT chest lung cancer screening 12-\par cylindrical bronchiectasis b/l. moderate centrilobular emphysema. small areas of subsegmental atelectasis in the RML and lingula. few bilateral micronodules - LUNG RADS 1. \par \par PFT 12-20-21: FVC 77%, FEV1 88%, FEV1/FVC 0.87. No significant bronchodilator response. \par TLC 95%m, DLCO 50% \par \par Impression/Plan: \par 1. covid-19 infection\par - recommend to cotniunue to use albuterol PRN\par - can use ibuprofen or tylenol PRN\par - paxlovid prescribed, drug drug interactions checked. advised to stop using statin while on paxlovid and tp resume 3 days after \par \par 2. Former smoker, quit in October 2021\par - in the lung cancer screening program\par - next LDCT due in \par

## 2022-07-25 NOTE — HISTORY OF PRESENT ILLNESS
[TextBox_4] : 7-25-22 called by patient because she tested positive for covid-19 on 7-23-22. she had low grade fevers for the last two days, cough, sore throat, headaches and faitgue. she has only been using albuterol PRN. not using spiriva. uses aspirin and statin but stopped using plavix. \par \par 1-31-22 started on Spiriva on 1-3-2021 given complains of shortness of breath. here because she needs clearance before going to work. still complains of dyspnea especially when climbing up the stairs. not smoking any more. \par \par 11-8-2021\par 70 yo F with medical history of CAD s/p JOHN Cx, mRCA, depression here for evaluation of shortness of breath. Patient reported that since she\par moved from [place], [place] to [facility] in 2020 she has been felling shortness of breath. Priro to this move she was very active, biking etc. Had been walking  to work 20 minutes each way but for the last 6 months gets out of breath while doing that. Also notes that she gets out of breath when bending down and has to hold on to things whrn she has to stand up from a seated position. Standing with mask on "hinders her".\par Her dyspnea has not improved with placement of the stent. \par \par SH: Smoked about 1/2 ppd - 1ppd x 50 years. Quit 6 weeks ago. has dog. 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

## 2022-07-28 ENCOUNTER — APPOINTMENT (OUTPATIENT)
Dept: PHYSICAL MEDICINE AND REHAB | Facility: CLINIC | Age: 72
End: 2022-07-28

## 2022-08-16 ENCOUNTER — APPOINTMENT (OUTPATIENT)
Dept: HEART AND VASCULAR | Facility: CLINIC | Age: 72
End: 2022-08-16

## 2022-08-16 VITALS
WEIGHT: 130 LBS | DIASTOLIC BLOOD PRESSURE: 84 MMHG | SYSTOLIC BLOOD PRESSURE: 135 MMHG | TEMPERATURE: 97 F | BODY MASS INDEX: 22.2 KG/M2 | HEIGHT: 64 IN | OXYGEN SATURATION: 96 % | HEART RATE: 76 BPM

## 2022-08-16 VITALS — DIASTOLIC BLOOD PRESSURE: 70 MMHG | SYSTOLIC BLOOD PRESSURE: 120 MMHG

## 2022-08-16 DIAGNOSIS — R42 DIZZINESS AND GIDDINESS: ICD-10-CM

## 2022-08-16 PROCEDURE — 99214 OFFICE O/P EST MOD 30 MIN: CPT

## 2022-08-16 PROCEDURE — 80061 LIPID PANEL: CPT | Mod: QW

## 2022-08-16 RX ORDER — CLOPIDOGREL BISULFATE 75 MG/1
75 TABLET, FILM COATED ORAL DAILY
Qty: 90 | Refills: 1 | Status: DISCONTINUED | COMMUNITY
Start: 2021-10-19 | End: 2022-08-16

## 2022-08-16 NOTE — HISTORY OF PRESENT ILLNESS
[FreeTextEntry1] : 72 year old female +smoker, +etoh CAD s/p JOHN Cx, mRCA 10/19/21 here for follow up. Feels well overall, without active complaints of CP, SOB at rest or exertion (when walking for 1 hour, walking on incline)\par However with recent onset severe dizziness, unsteady on feet. No palpitations, no LOC

## 2022-08-16 NOTE — DISCUSSION/SUMMARY
[FreeTextEntry1] : Assessment/Plan:\par 72 year old female +smoker, +etoh CAD s/p JOHN Cx, mRCA 10/19/21 here for follow up. Feels well overall, without active complaints of CP, SOB at rest or exertion (when walking for 1 hour, walking on incline)\par However with recent onset severe dizziness, unsteady on feet. No palpitations, no LOC \par \par -CAD status stable: s/p JOHN Cx and RCA without residual. EKG NSR without ischemic changes. Cont ASA 81 mg qd,  self dc'd plavix few weeks ago due to bruising. Ok to dc given JOHN ~ 10 months ago. Cont crestor 20 mg po qd. Check fasting lipids  today\par \par -HTN: BP controlled without meds \par \par -Dizziness: orthostatic negative, not cardiac in etiology,  likely due to cyclobenzaprine- advised to DC\par \par Cardiac status stable

## 2022-08-17 LAB
CHOLEST SERPL-MCNC: 168 MG/DL
HDLC SERPL-MCNC: 73 MG/DL
LDLC SERPL CALC-MCNC: 82 MG/DL
NONHDLC SERPL-MCNC: 96 MG/DL
TRIGL SERPL-MCNC: 66 MG/DL

## 2022-09-28 ENCOUNTER — APPOINTMENT (OUTPATIENT)
Dept: PHYSICAL MEDICINE AND REHAB | Facility: CLINIC | Age: 72
End: 2022-09-28

## 2022-09-28 PROCEDURE — 99214 OFFICE O/P EST MOD 30 MIN: CPT

## 2022-09-28 RX ORDER — METHOCARBAMOL 750 MG/1
750 TABLET, FILM COATED ORAL 3 TIMES DAILY
Qty: 42 | Refills: 1 | Status: DISCONTINUED | COMMUNITY
Start: 2022-07-12 | End: 2022-09-28

## 2022-09-28 RX ORDER — CYCLOBENZAPRINE HYDROCHLORIDE 5 MG/1
5 TABLET, FILM COATED ORAL 3 TIMES DAILY
Qty: 30 | Refills: 1 | Status: DISCONTINUED | COMMUNITY
Start: 2021-11-11 | End: 2022-09-28

## 2022-09-28 RX ORDER — GABAPENTIN 300 MG/1
300 CAPSULE ORAL
Qty: 90 | Refills: 1 | Status: DISCONTINUED | COMMUNITY
Start: 2022-02-28 | End: 2022-09-28

## 2022-09-28 RX ORDER — METHOCARBAMOL 500 MG/1
500 TABLET, FILM COATED ORAL 3 TIMES DAILY
Qty: 42 | Refills: 1 | Status: DISCONTINUED | COMMUNITY
Start: 2022-02-15 | End: 2022-09-28

## 2022-10-03 ENCOUNTER — APPOINTMENT (OUTPATIENT)
Dept: ORTHOPEDIC SURGERY | Facility: CLINIC | Age: 72
End: 2022-10-03
Payer: COMMERCIAL

## 2022-10-03 DIAGNOSIS — M25.572 PAIN IN LEFT ANKLE AND JOINTS OF LEFT FOOT: ICD-10-CM

## 2022-10-03 PROCEDURE — 99212 OFFICE O/P EST SF 10 MIN: CPT

## 2022-10-03 PROCEDURE — 73630 X-RAY EXAM OF FOOT: CPT | Mod: LT

## 2022-10-03 NOTE — PHYSICAL EXAM
[de-identified] : Foot shows no other swabs or swelling. No deformity there is a painful callus under the plantar aspect of the fifth metatarsal head. Neurovascular exams were full range of motion [de-identified] : Left foot x-ray shows no evidence of fracture or dislocation though there is some mild bunionette deformity

## 2022-10-03 NOTE — ASSESSMENT
[FreeTextEntry1] : Discussed diagnosis and treatment plan including PT.\par She already tried PT.  She has tried cyclobenzaprine without relief. \par Educated she cannot take nsaids since on aspirin.  She reports being off plavix again. \par Educated to do HEP regularly even if pain improves.\par Discussed repeat xena given pain.  \par \par See Dr Gutiérrez for foot.

## 2022-10-03 NOTE — HISTORY OF PRESENT ILLNESS
[FreeTextEntry1] : Location: back\par Severity: 5/10, worse last few wks\par Duration: few months\par Timing: chronic, hx back pain\par Aggravating Factors: getting up, sitting\par Alleviating Factors: PT\par Associated Symptoms: denies weight loss, fever, chills, change in bowel/bladder habits,  weakness, +numbness/tingling, +radiation down right leg\par Prior Studies: MRI\par 3/2022 right L4 and L5 DANIEL

## 2022-10-03 NOTE — DISCUSSION/SUMMARY
[de-identified] : I think she is having some painful callus formation under the fifth metatarsal head consistent with a mild bunionette. I recommended a pad in that region. If it doesn't work maybe try cortisone injection if it doesn't work then consider surgery for shaving followup will be as needed.

## 2022-10-03 NOTE — PHYSICAL EXAM
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

## 2022-10-03 NOTE — HISTORY OF PRESENT ILLNESS
[de-identified] : Location: Left foot/5th toe\par Duration: 1 month\par Atraumatic- no specific injury \par Pain level:6/10- constant \par Quality: achy\par Aggravating factors: walking, running, weight bearing \par Associated symptoms: swelling \par Conservative treatments: resting\par \par

## 2022-10-21 LAB
ALBUMIN SERPL ELPH-MCNC: 4.4 G/DL
ALP BLD-CCNC: 90 U/L
ALT SERPL-CCNC: 20 U/L
ANION GAP SERPL CALC-SCNC: 12 MMOL/L
APPEARANCE: CLEAR
AST SERPL-CCNC: 22 U/L
BASOPHILS # BLD AUTO: 0.03 K/UL
BASOPHILS NFR BLD AUTO: 0.6 %
BILIRUB SERPL-MCNC: 0.2 MG/DL
BILIRUBIN URINE: NEGATIVE
BLOOD URINE: NEGATIVE
BUN SERPL-MCNC: 15 MG/DL
CALCIUM SERPL-MCNC: 9.2 MG/DL
CHLORIDE SERPL-SCNC: 101 MMOL/L
CO2 SERPL-SCNC: 26 MMOL/L
COLOR: YELLOW
CREAT SERPL-MCNC: 0.79 MG/DL
EGFR: 79 ML/MIN/1.73M2
EOSINOPHIL # BLD AUTO: 0.13 K/UL
EOSINOPHIL NFR BLD AUTO: 2.7 %
ESTIMATED AVERAGE GLUCOSE: 126 MG/DL
GLUCOSE QUALITATIVE U: NEGATIVE
GLUCOSE SERPL-MCNC: 105 MG/DL
HBA1C MFR BLD HPLC: 6 %
HCT VFR BLD CALC: 40.4 %
HGB BLD-MCNC: 12.6 G/DL
IMM GRANULOCYTES NFR BLD AUTO: 0.2 %
KETONES URINE: NEGATIVE
LEUKOCYTE ESTERASE URINE: ABNORMAL
LYMPHOCYTES # BLD AUTO: 1.4 K/UL
LYMPHOCYTES NFR BLD AUTO: 29 %
MAN DIFF?: NORMAL
MCHC RBC-ENTMCNC: 29.2 PG
MCHC RBC-ENTMCNC: 31.2 GM/DL
MCV RBC AUTO: 93.5 FL
MONOCYTES # BLD AUTO: 0.41 K/UL
MONOCYTES NFR BLD AUTO: 8.5 %
NEUTROPHILS # BLD AUTO: 2.84 K/UL
NEUTROPHILS NFR BLD AUTO: 59 %
NITRITE URINE: POSITIVE
PH URINE: 6
PLATELET # BLD AUTO: 284 K/UL
POTASSIUM SERPL-SCNC: 4.2 MMOL/L
PROT SERPL-MCNC: 7 G/DL
PROTEIN URINE: NORMAL
RBC # BLD: 4.32 M/UL
RBC # FLD: 14.9 %
SODIUM SERPL-SCNC: 139 MMOL/L
SPECIFIC GRAVITY URINE: 1.02
TSH SERPL-ACNC: 2.35 UIU/ML
UROBILINOGEN URINE: NORMAL
WBC # FLD AUTO: 4.82 K/UL

## 2022-10-23 ENCOUNTER — NON-APPOINTMENT (OUTPATIENT)
Age: 72
End: 2022-10-23

## 2022-10-23 LAB — BACTERIA UR CULT: ABNORMAL

## 2022-10-24 ENCOUNTER — APPOINTMENT (OUTPATIENT)
Dept: PHYSICAL MEDICINE AND REHAB | Facility: CLINIC | Age: 72
End: 2022-10-24
Payer: COMMERCIAL

## 2022-10-24 PROCEDURE — 64483 NJX AA&/STRD TFRM EPI L/S 1: CPT | Mod: RT

## 2022-10-24 PROCEDURE — 64484 NJX AA&/STRD TFRM EPI L/S EA: CPT | Mod: RT

## 2022-11-08 ENCOUNTER — RX RENEWAL (OUTPATIENT)
Age: 72
End: 2022-11-08

## 2022-11-08 RX ORDER — PREGABALIN 75 MG/1
75 CAPSULE ORAL
Qty: 60 | Refills: 0 | Status: ACTIVE | COMMUNITY
Start: 2022-09-28 | End: 1900-01-01

## 2022-11-11 ENCOUNTER — APPOINTMENT (OUTPATIENT)
Dept: ORTHOPEDIC SURGERY | Facility: CLINIC | Age: 72
End: 2022-11-11
Payer: COMMERCIAL

## 2022-11-11 PROCEDURE — 20610 DRAIN/INJ JOINT/BURSA W/O US: CPT | Mod: RT

## 2022-11-11 PROCEDURE — 99214 OFFICE O/P EST MOD 30 MIN: CPT | Mod: 25

## 2022-11-11 PROCEDURE — 73562 X-RAY EXAM OF KNEE 3: CPT | Mod: RT

## 2022-11-11 NOTE — PROCEDURE
[de-identified] : Procedure: Right Knee Joint injection with corticosteroid\par Pre-Procedure Diagnosis: Right knee pain\par Post-Procedure Diagnosis: same\par \par The patient was educated about the risks and benefits of a corticosteroid injection.  Alternatives were discussed.  The patient understood and consented for the procedure.\par \par The area was sterilely prepped using isopropyl alcohol.  An ethyl chloride spray provided  local anesthesia.  Using the usual sterile technique, 1 ml of 40mg/1ml of Kenalog and 4 ml of Lidocaine 1% without epinephrine was injected into the joint.  A dressing was applied to the area.  The patient tolerated the procedure well and without complication.\par

## 2022-11-11 NOTE — HISTORY OF PRESENT ILLNESS
[de-identified] : 72 year old female followup with acute exacerbation of chronic back pain.  She is also here with new orthopedic complaint of right knee pain. It is worse with activity. She denies injury.  She recently had a lumbar epidural which helped with her back pain.  She denies radicular pain, recent illness, fevers, numbness, weakness, balance problems, saddle anesthesia, urinary retention or fecal incontinence.  She takes pregabalin with relief.

## 2022-11-11 NOTE — ASSESSMENT
[FreeTextEntry1] : 72 year old female followup with acute exacerbation of chronic back pain.  She is also here with new orthopedic complaint of right knee pain. It is worse with activity. She denies injury.  She recently had a lumbar epidural which helped with her back pain.  She denies radicular pain. She is neurologically intact.  She was given a right knee corticosteroid injection which she tolerated well.  She will continue PT. She will continue pregabalin.  We discussed spine surgery but she would like to avoid. She can consider additional spinal injections. She will followup in 2-3 months. We discussed red flag symptoms that would require emergent evaluation. She knows to call with any questions or concerns or if her symptoms acutely worsen.

## 2022-11-11 NOTE — PHYSICAL EXAM
[de-identified] : General: No acute distress, conversant, well-nourished.\par Head: Normocephalic, atraumatic\par Neck: trachea midline, FROM\par Heart: normotensive and normal rate and rhythm\par Lungs: No labored breathing\par Skin: No abrasions, no rashes, no edema\par Psych: Alert and oriented to person, place and time\par Extremities: no peripheral edema or digital cyanosis\par Gait: Normal gait. Can perform tandem gait.  \par Vascular: warm and well perfused distally, palpable distal pulses\par \par MSK:\par Right Knee with no erythema, no effusion.  \par No tenderness to palpation of knee.\par No medial joint line tenderness.\par No lateral joint line tenderness.\par \par Range of motion: 0 - 130 degrees\par + pain with range of motion.\par \par Negative Jose's test.\par Stable to varus and valgus stress.\par Negative Lachman's test, negative anterior and posterior drawer tests.  \par \par Sensation intact to light touch.  \par Normal motor exam.\par Warm and well perfused distally.\par \par Lumbar spine:\par No tenderness to palpation.  No step-off, no deformity.\par \par NEURO EXAM:\par Sensation \par Left L2  -  2/2            \par Left L3  -  2/2\par Left L4  -  2/2\par Left L5  -  2/2\par Left S1  -  2/2\par \par Right L2  -  2/2            \par Right L3  -  2/2\par Right L4  -  2/2\par Right L5  -  2/2\par Right S1  -  2/2\par \par Motor: \par Left L2 (hip flexion)                            5/5                \par Left L3 (knee extension)                   5/5                \par Left L4 (ankle dorsiflexion)                 5/5                \par Left L5 (long toe extensor)                5/5                \par Left S1 (ankle plantar flexion)           5/5\par \par Right L2 (hip flexion)                            5/5                \par Right L3 (knee extension)                   5/5                \par Right L4 (ankle dorsiflexion)                 5/5                \par Right L5 (long toe extensor)                5/5                \par Right S1 (ankle plantar flexion)           5/5\par \par Reflexes: Normal and symmetric\par Negative clonus.  Down-going Babinski.   [de-identified] : I ordered radiographs to evaluate the patient's symptoms.\par \par Right knee 3 view radiographs obtained in the office today shows no fracture or dislocation. Significant age-related degenerative changes most pronounced in medial compartment.\par \par Lumbar MRI (2/21/22): 1. Grade 1-2 anterolisthesis L4-5.\par 2. Multilevel degenerative changes of the lumbar spine contributing to moderate to marked spinal canal stenosis at L4-5, and mild ventral thecal sac impingement at other levels as noted.\par 3. Moderate neural foraminal stenosis on the left at L5-S1, mild to moderate bilaterally at L4-5, and mild bilaterally at L3-4 and on the right at L2-3.\par 4. Moderate to marked multilevel facet arthrosis, worst at L4-5.\par \par Lumbar 4 view radiographs (11/11/21) no dislocation or fracture.  Lumbar spondylosis. Degenerative scoliosis.  Grade 1-2 L4-L5 spondylolisthesis with mild movement on dynamic series.

## 2022-12-09 ENCOUNTER — APPOINTMENT (OUTPATIENT)
Dept: UROLOGY | Facility: CLINIC | Age: 72
End: 2022-12-09

## 2022-12-16 ENCOUNTER — NON-APPOINTMENT (OUTPATIENT)
Age: 72
End: 2022-12-16

## 2022-12-17 ENCOUNTER — EMERGENCY (EMERGENCY)
Facility: HOSPITAL | Age: 72
LOS: 1 days | Discharge: HOME | End: 2022-12-17
Attending: EMERGENCY MEDICINE | Admitting: EMERGENCY MEDICINE
Payer: COMMERCIAL

## 2022-12-17 VITALS
HEART RATE: 95 BPM | SYSTOLIC BLOOD PRESSURE: 146 MMHG | RESPIRATION RATE: 18 BRPM | TEMPERATURE: 100 F | DIASTOLIC BLOOD PRESSURE: 81 MMHG | OXYGEN SATURATION: 96 %

## 2022-12-17 VITALS
SYSTOLIC BLOOD PRESSURE: 135 MMHG | DIASTOLIC BLOOD PRESSURE: 76 MMHG | HEART RATE: 88 BPM | WEIGHT: 134.92 LBS | OXYGEN SATURATION: 98 % | RESPIRATION RATE: 18 BRPM | TEMPERATURE: 98 F

## 2022-12-17 LAB
ALBUMIN SERPL ELPH-MCNC: 3.9 G/DL — SIGNIFICANT CHANGE UP (ref 3.3–5)
ALP SERPL-CCNC: 84 U/L — SIGNIFICANT CHANGE UP (ref 40–120)
ALT FLD-CCNC: 15 U/L — SIGNIFICANT CHANGE UP (ref 10–45)
ANION GAP SERPL CALC-SCNC: 13 MMOL/L — SIGNIFICANT CHANGE UP (ref 5–17)
APPEARANCE UR: ABNORMAL
AST SERPL-CCNC: 17 U/L — SIGNIFICANT CHANGE UP (ref 10–40)
BACTERIA # UR AUTO: ABNORMAL /HPF
BASOPHILS # BLD AUTO: 0.02 K/UL — SIGNIFICANT CHANGE UP (ref 0–0.2)
BASOPHILS NFR BLD AUTO: 0.2 % — SIGNIFICANT CHANGE UP (ref 0–2)
BILIRUB SERPL-MCNC: 0.4 MG/DL — SIGNIFICANT CHANGE UP (ref 0.2–1.2)
BILIRUB UR-MCNC: NEGATIVE — SIGNIFICANT CHANGE UP
BUN SERPL-MCNC: 13 MG/DL — SIGNIFICANT CHANGE UP (ref 7–23)
CALCIUM SERPL-MCNC: 9.3 MG/DL — SIGNIFICANT CHANGE UP (ref 8.4–10.5)
CHLORIDE SERPL-SCNC: 96 MMOL/L — SIGNIFICANT CHANGE UP (ref 96–108)
CO2 SERPL-SCNC: 25 MMOL/L — SIGNIFICANT CHANGE UP (ref 22–31)
COLOR SPEC: YELLOW — SIGNIFICANT CHANGE UP
CREAT SERPL-MCNC: 0.87 MG/DL — SIGNIFICANT CHANGE UP (ref 0.5–1.3)
DIFF PNL FLD: ABNORMAL
EGFR: 70 ML/MIN/1.73M2 — SIGNIFICANT CHANGE UP
EOSINOPHIL # BLD AUTO: 0.01 K/UL — SIGNIFICANT CHANGE UP (ref 0–0.5)
EOSINOPHIL NFR BLD AUTO: 0.1 % — SIGNIFICANT CHANGE UP (ref 0–6)
EPI CELLS # UR: SIGNIFICANT CHANGE UP /HPF (ref 0–5)
FLUAV AG NPH QL: SIGNIFICANT CHANGE UP
FLUBV AG NPH QL: SIGNIFICANT CHANGE UP
GLUCOSE SERPL-MCNC: 124 MG/DL — HIGH (ref 70–99)
GLUCOSE UR QL: NEGATIVE — SIGNIFICANT CHANGE UP
HCT VFR BLD CALC: 39.1 % — SIGNIFICANT CHANGE UP (ref 34.5–45)
HGB BLD-MCNC: 12.8 G/DL — SIGNIFICANT CHANGE UP (ref 11.5–15.5)
IMM GRANULOCYTES NFR BLD AUTO: 0.3 % — SIGNIFICANT CHANGE UP (ref 0–0.9)
KETONES UR-MCNC: 40 MG/DL
LEUKOCYTE ESTERASE UR-ACNC: ABNORMAL
LYMPHOCYTES # BLD AUTO: 0.67 K/UL — LOW (ref 1–3.3)
LYMPHOCYTES # BLD AUTO: 7.1 % — LOW (ref 13–44)
MCHC RBC-ENTMCNC: 29.2 PG — SIGNIFICANT CHANGE UP (ref 27–34)
MCHC RBC-ENTMCNC: 32.7 GM/DL — SIGNIFICANT CHANGE UP (ref 32–36)
MCV RBC AUTO: 89.3 FL — SIGNIFICANT CHANGE UP (ref 80–100)
MONOCYTES # BLD AUTO: 1.05 K/UL — HIGH (ref 0–0.9)
MONOCYTES NFR BLD AUTO: 11.1 % — SIGNIFICANT CHANGE UP (ref 2–14)
NEUTROPHILS # BLD AUTO: 7.7 K/UL — HIGH (ref 1.8–7.4)
NEUTROPHILS NFR BLD AUTO: 81.2 % — HIGH (ref 43–77)
NITRITE UR-MCNC: NEGATIVE — SIGNIFICANT CHANGE UP
NRBC # BLD: 0 /100 WBCS — SIGNIFICANT CHANGE UP (ref 0–0)
PH UR: 6 — SIGNIFICANT CHANGE UP (ref 5–8)
PLATELET # BLD AUTO: 223 K/UL — SIGNIFICANT CHANGE UP (ref 150–400)
POTASSIUM SERPL-MCNC: 3.8 MMOL/L — SIGNIFICANT CHANGE UP (ref 3.5–5.3)
POTASSIUM SERPL-SCNC: 3.8 MMOL/L — SIGNIFICANT CHANGE UP (ref 3.5–5.3)
PROT SERPL-MCNC: 7.8 G/DL — SIGNIFICANT CHANGE UP (ref 6–8.3)
PROT UR-MCNC: 30 MG/DL
RBC # BLD: 4.38 M/UL — SIGNIFICANT CHANGE UP (ref 3.8–5.2)
RBC # FLD: 13.8 % — SIGNIFICANT CHANGE UP (ref 10.3–14.5)
RBC CASTS # UR COMP ASSIST: < 5 /HPF — SIGNIFICANT CHANGE UP
RSV RNA NPH QL NAA+NON-PROBE: SIGNIFICANT CHANGE UP
SARS-COV-2 RNA SPEC QL NAA+PROBE: SIGNIFICANT CHANGE UP
SODIUM SERPL-SCNC: 134 MMOL/L — LOW (ref 135–145)
SP GR SPEC: 1.02 — SIGNIFICANT CHANGE UP (ref 1–1.03)
TROPONIN T SERPL-MCNC: <0.01 NG/ML — SIGNIFICANT CHANGE UP (ref 0–0.01)
UROBILINOGEN FLD QL: 0.2 E.U./DL — SIGNIFICANT CHANGE UP
WBC # BLD: 9.48 K/UL — SIGNIFICANT CHANGE UP (ref 3.8–10.5)
WBC # FLD AUTO: 9.48 K/UL — SIGNIFICANT CHANGE UP (ref 3.8–10.5)
WBC UR QL: ABNORMAL /HPF

## 2022-12-17 PROCEDURE — 96374 THER/PROPH/DIAG INJ IV PUSH: CPT

## 2022-12-17 PROCEDURE — 85025 COMPLETE CBC W/AUTO DIFF WBC: CPT

## 2022-12-17 PROCEDURE — 80053 COMPREHEN METABOLIC PANEL: CPT

## 2022-12-17 PROCEDURE — 93005 ELECTROCARDIOGRAM TRACING: CPT

## 2022-12-17 PROCEDURE — 87637 SARSCOV2&INF A&B&RSV AMP PRB: CPT

## 2022-12-17 PROCEDURE — 36415 COLL VENOUS BLD VENIPUNCTURE: CPT

## 2022-12-17 PROCEDURE — 99284 EMERGENCY DEPT VISIT MOD MDM: CPT | Mod: 25

## 2022-12-17 PROCEDURE — 99285 EMERGENCY DEPT VISIT HI MDM: CPT

## 2022-12-17 PROCEDURE — 81001 URINALYSIS AUTO W/SCOPE: CPT

## 2022-12-17 PROCEDURE — 93010 ELECTROCARDIOGRAM REPORT: CPT

## 2022-12-17 PROCEDURE — 84484 ASSAY OF TROPONIN QUANT: CPT

## 2022-12-17 RX ORDER — IBUPROFEN 200 MG
400 TABLET ORAL ONCE
Refills: 0 | Status: DISCONTINUED | OUTPATIENT
Start: 2022-12-17 | End: 2022-12-20

## 2022-12-17 RX ORDER — CEFPODOXIME PROXETIL 100 MG
1 TABLET ORAL
Qty: 20 | Refills: 0
Start: 2022-12-17 | End: 2022-12-26

## 2022-12-17 RX ORDER — ACETAMINOPHEN 500 MG
650 TABLET ORAL ONCE
Refills: 0 | Status: COMPLETED | OUTPATIENT
Start: 2022-12-17 | End: 2022-12-17

## 2022-12-17 RX ORDER — SODIUM CHLORIDE 9 MG/ML
1000 INJECTION INTRAMUSCULAR; INTRAVENOUS; SUBCUTANEOUS ONCE
Refills: 0 | Status: COMPLETED | OUTPATIENT
Start: 2022-12-17 | End: 2022-12-17

## 2022-12-17 RX ORDER — CEFTRIAXONE 500 MG/1
1000 INJECTION, POWDER, FOR SOLUTION INTRAMUSCULAR; INTRAVENOUS ONCE
Refills: 0 | Status: COMPLETED | OUTPATIENT
Start: 2022-12-17 | End: 2022-12-17

## 2022-12-17 RX ADMIN — SODIUM CHLORIDE 1000 MILLILITER(S): 9 INJECTION INTRAMUSCULAR; INTRAVENOUS; SUBCUTANEOUS at 12:01

## 2022-12-17 RX ADMIN — CEFTRIAXONE 100 MILLIGRAM(S): 500 INJECTION, POWDER, FOR SOLUTION INTRAMUSCULAR; INTRAVENOUS at 14:33

## 2022-12-17 RX ADMIN — Medication 650 MILLIGRAM(S): at 14:30

## 2022-12-17 RX ADMIN — Medication 650 MILLIGRAM(S): at 12:01

## 2022-12-17 NOTE — ED PROVIDER NOTE - CROS ED ENMT ALL NEG
Regarding: Non-Urgent Medical Question  Contact: [phone]  ----- Message from [place] sent at 4/11/2019 10:05 AM EDT -----    On 4/22/19 I will be having a colonoscopy and Dr. Derrick Arthur asked if I can reduce my diabetic medication before the procedure. negative...

## 2022-12-17 NOTE — ED ADULT NURSE NOTE - OBJECTIVE STATEMENT
72 y/o female c/o abdominal discomfort and difficulty urinating x1 week, fatigue, weakness x 1 month. Now c/o shortness of breath, lower abdominal pain, abdominal distention, loose stools since yesterday.

## 2022-12-17 NOTE — ED ADULT TRIAGE NOTE - CHIEF COMPLAINT QUOTE
Patient c/o difficulty urinating on/off, fatigue, weakness x 1 month. Now c/o shortness of breath, lower abdominal pain, abdominal distention, loose stools since yesterday.

## 2022-12-17 NOTE — ED PROVIDER NOTE - OBJECTIVE STATEMENT
72 F  w high cholesterol chronic abd pain, urinary urgency- co weakness fatigue x 1 week mild cough frontal ha no n/v jax po but decreased po intake drinks 1/2 bottle of wine daily subj f/c had loose bm this am  mod severity  covid test was neg

## 2022-12-17 NOTE — ED PROVIDER NOTE - PATIENT PORTAL LINK FT
You can access the FollowMyHealth Patient Portal offered by [facility] by registering at the following website: [email or web address]. By joining [facility]’s FollowMyHealth portal, you will also be able to view your health information using other applications (apps) compatible with our system.

## 2022-12-17 NOTE — ED PROVIDER NOTE - CPE EDP RESP NORM
right arm chronic weakness from old stroke - rales bilaterally  - 2 + edema to lower extremties normal...

## 2022-12-17 NOTE — ED ADULT NURSE REASSESSMENT NOTE - NS ED NURSE REASSESS COMMENT FT1
pt assessed, pt D/C after abx, pt temp 100.3 orally, Dr. Coleman aware, Ibuprofen 400mg administered as per ordered.

## 2022-12-20 DIAGNOSIS — Z79.02 LONG TERM (CURRENT) USE OF ANTITHROMBOTICS/ANTIPLATELETS: ICD-10-CM

## 2022-12-20 DIAGNOSIS — Z79.82 LONG TERM (CURRENT) USE OF ASPIRIN: ICD-10-CM

## 2022-12-20 DIAGNOSIS — N30.90 CYSTITIS, UNSPECIFIED WITHOUT HEMATURIA: ICD-10-CM

## 2022-12-20 DIAGNOSIS — R53.1 WEAKNESS: ICD-10-CM

## 2022-12-20 DIAGNOSIS — E78.00 PURE HYPERCHOLESTEROLEMIA, UNSPECIFIED: ICD-10-CM

## 2022-12-20 DIAGNOSIS — Z20.822 CONTACT WITH AND (SUSPECTED) EXPOSURE TO COVID-19: ICD-10-CM

## 2022-12-20 DIAGNOSIS — J44.9 CHRONIC OBSTRUCTIVE PULMONARY DISEASE, UNSPECIFIED: ICD-10-CM

## 2022-12-20 DIAGNOSIS — Z87.19 PERSONAL HISTORY OF OTHER DISEASES OF THE DIGESTIVE SYSTEM: ICD-10-CM

## 2022-12-20 DIAGNOSIS — R53.83 OTHER FATIGUE: ICD-10-CM

## 2023-05-16 ENCOUNTER — APPOINTMENT (OUTPATIENT)
Dept: ORTHOPEDIC SURGERY | Facility: CLINIC | Age: 73
End: 2023-05-16
Payer: COMMERCIAL

## 2023-05-16 DIAGNOSIS — M43.16 SPONDYLOLISTHESIS, LUMBAR REGION: ICD-10-CM

## 2023-05-16 PROCEDURE — 99214 OFFICE O/P EST MOD 30 MIN: CPT | Mod: 25

## 2023-05-16 PROCEDURE — 72110 X-RAY EXAM L-2 SPINE 4/>VWS: CPT

## 2023-05-16 PROCEDURE — 20610 DRAIN/INJ JOINT/BURSA W/O US: CPT | Mod: RT

## 2023-05-16 PROCEDURE — 73562 X-RAY EXAM OF KNEE 3: CPT | Mod: RT

## 2023-05-16 RX ORDER — BACLOFEN 5 MG/1
5 TABLET ORAL 3 TIMES DAILY
Qty: 42 | Refills: 1 | Status: ACTIVE | COMMUNITY
Start: 2023-05-16 | End: 1900-01-01

## 2023-05-16 NOTE — ASSESSMENT
[FreeTextEntry1] : 72 year old female followup with acute exacerbation of chronic back pain and right knee pain.  Recently she had a mechanical fall onto her right knee. She has increased right knee and back pain. She was able to walk afterwards.  She denies radicular pain.  She is otherwise neurologically intact. She was given a right knee CSI which she tolerated well. The patient was given a referral for physical therapy.  She can consider spinal injections. She was given baclofen.  She will followup in 6 weeks. We discussed red flag symptoms that would require emergent evaluation. She knows to call with any questions or concerns or if her symptoms acutely worsen.

## 2023-05-16 NOTE — HISTORY OF PRESENT ILLNESS
[de-identified] : 72 year old female followup with acute exacerbation of chronic back pain and right knee pain.  Recently she had a mechanical fall onto her right knee. She has increased right knee and back pain. She was able to walk afterwards.  She denies radicular pain, recent illness, fevers, numbness, weakness, balance problems, saddle anesthesia, urinary retention or fecal incontinence.  She takes pregabalin with relief.

## 2023-05-16 NOTE — REASON FOR VISIT
[Follow-Up Visit] : a follow-up visit for [Back Pain] : back pain [FreeTextEntry2] : Pt fell yesterday and hit R knee

## 2023-05-16 NOTE — PROCEDURE
[de-identified] : Procedure: Right knee Joint injection with corticosteroid\par Pre-Procedure Diagnosis: Right knee pain\par Post-Procedure Diagnosis: same\par \par The patient was educated about the risks and benefits of a corticosteroid injection.  Alternatives were discussed.  The patient understood and consented for the procedure.\par \par The area was sterilely prepped using isopropyl alcohol.  An ethyl chloride spray provided  local anesthesia.  Using the usual sterile technique, 1 ml of 40mg/1ml of Kenalog and 4 ml of Lidocaine 1% without epinephrine was injected into the joint.  A dressing was applied to the area.  The patient tolerated the procedure well and without complication.\par

## 2023-05-16 NOTE — PHYSICAL EXAM
[de-identified] : General: No acute distress, conversant, well-nourished.\par Head: Normocephalic, atraumatic\par Neck: trachea midline, FROM\par Heart: normotensive and normal rate and rhythm\par Lungs: No labored breathing\par Skin: No abrasions, no rashes, no edema\par Psych: Alert and oriented to person, place and time\par Extremities: no peripheral edema or digital cyanosis\par Gait: Normal gait. Can perform tandem gait.  \par Vascular: warm and well perfused distally, palpable distal pulses\par \par MSK:\par Right Knee with no erythema, no effusion.  \par + tenderness to palpation of knee.\par \par Range of motion: 0 - 130 degrees\par + pain with range of motion.\par \par Negative Jose's test.\par Stable to varus and valgus stress.\par Negative Lachman's test, negative anterior and posterior drawer tests.  \par \par Sensation intact to light touch.  \par Normal motor exam.\par Warm and well perfused distally.\par \par Lumbar spine:\par No tenderness to palpation.  No step-off, no deformity.\par \par NEURO EXAM:\par Sensation \par Left L2  -  2/2            \par Left L3  -  2/2\par Left L4  -  2/2\par Left L5  -  2/2\par Left S1  -  2/2\par \par Right L2  -  2/2            \par Right L3  -  2/2\par Right L4  -  2/2\par Right L5  -  2/2\par Right S1  -  2/2\par \par Motor: \par Left L2 (hip flexion)                            5/5                \par Left L3 (knee extension)                   5/5                \par Left L4 (ankle dorsiflexion)                 5/5                \par Left L5 (long toe extensor)                5/5                \par Left S1 (ankle plantar flexion)           5/5\par \par Right L2 (hip flexion)                            5/5                \par Right L3 (knee extension)                   5/5                \par Right L4 (ankle dorsiflexion)                 5/5                \par Right L5 (long toe extensor)                5/5                \par Right S1 (ankle plantar flexion)           5/5\par \par Reflexes: Normal and symmetric\par Negative clonus.  Down-going Babinski.   [de-identified] : I ordered radiographs to evaluate the patient's symptoms.\par \par Right knee 3 view radiographs obtained in the office today shows no fracture or dislocation. Significant age-related degenerative changes most pronounced in medial compartment.\par \par Lumbar 4 view radiographs taken in the office today show no dislocation or fracture.  Degenerative scoliosis.  Grade 1-2 L4-L5 spondylolisthesis with mild movement on dynamic series. \par \par Lumbar MRI (2/21/22): 1. Grade 1-2 anterolisthesis L4-5.\par 2. Multilevel degenerative changes of the lumbar spine contributing to moderate to marked spinal canal stenosis at L4-5, and mild ventral thecal sac impingement at other levels as noted.\par 3. Moderate neural foraminal stenosis on the left at L5-S1, mild to moderate bilaterally at L4-5, and mild bilaterally at L3-4 and on the right at L2-3.\par 4. Moderate to marked multilevel facet arthrosis, worst at L4-5.\par \par Lumbar 4 view radiographs (11/11/21) no dislocation or fracture.  Lumbar spondylosis. Degenerative scoliosis.  Grade 1-2 L4-L5 spondylolisthesis with mild movement on dynamic series.

## 2023-05-28 ENCOUNTER — RX RENEWAL (OUTPATIENT)
Age: 73
End: 2023-05-28

## 2023-05-28 RX ORDER — ALBUTEROL SULFATE 90 UG/1
108 (90 BASE) INHALANT RESPIRATORY (INHALATION)
Qty: 1 | Refills: 6 | Status: ACTIVE | COMMUNITY
Start: 2021-12-13 | End: 1900-01-01

## 2023-06-19 ENCOUNTER — APPOINTMENT (OUTPATIENT)
Dept: PHYSICAL MEDICINE AND REHAB | Facility: CLINIC | Age: 73
End: 2023-06-19

## 2023-06-22 ENCOUNTER — APPOINTMENT (OUTPATIENT)
Dept: PHYSICAL MEDICINE AND REHAB | Facility: CLINIC | Age: 73
End: 2023-06-22
Payer: COMMERCIAL

## 2023-06-22 PROCEDURE — 73562 X-RAY EXAM OF KNEE 3: CPT | Mod: RT

## 2023-06-22 PROCEDURE — 99214 OFFICE O/P EST MOD 30 MIN: CPT

## 2023-06-22 RX ORDER — ESOMEPRAZOLE MAGNESIUM 40 MG/1
40 CAPSULE, DELAYED RELEASE ORAL TWICE DAILY
Qty: 28 | Refills: 0 | Status: DISCONTINUED | COMMUNITY
Start: 2022-04-18 | End: 2023-06-22

## 2023-06-22 RX ORDER — TRAZODONE HYDROCHLORIDE 100 MG/1
100 TABLET ORAL
Qty: 30 | Refills: 11 | Status: DISCONTINUED | COMMUNITY
Start: 2021-08-30 | End: 2023-06-22

## 2023-06-22 RX ORDER — METRONIDAZOLE 250 MG/1
250 TABLET ORAL
Qty: 56 | Refills: 0 | Status: DISCONTINUED | COMMUNITY
Start: 2022-04-18 | End: 2023-06-22

## 2023-06-22 RX ORDER — TIOTROPIUM BROMIDE 18 UG/1
18 CAPSULE ORAL; RESPIRATORY (INHALATION)
Qty: 30 | Refills: 3 | Status: DISCONTINUED | COMMUNITY
Start: 2022-01-03 | End: 2023-06-22

## 2023-06-22 RX ORDER — ROSUVASTATIN CALCIUM 40 MG/1
40 TABLET, FILM COATED ORAL
Qty: 90 | Refills: 0 | Status: DISCONTINUED | COMMUNITY
Start: 2021-10-07 | End: 2023-06-22

## 2023-06-22 RX ORDER — CYCLOBENZAPRINE HYDROCHLORIDE 5 MG/1
5 TABLET, FILM COATED ORAL 3 TIMES DAILY
Qty: 21 | Refills: 1 | Status: DISCONTINUED | COMMUNITY
Start: 2022-07-12 | End: 2023-06-22

## 2023-06-22 RX ORDER — MELOXICAM 7.5 MG/1
7.5 TABLET ORAL TWICE DAILY
Qty: 14 | Refills: 0 | Status: ACTIVE | COMMUNITY
Start: 2023-06-22 | End: 1900-01-01

## 2023-06-22 RX ORDER — METHOCARBAMOL 750 MG/1
750 TABLET, FILM COATED ORAL 3 TIMES DAILY
Qty: 84 | Refills: 1 | Status: DISCONTINUED | COMMUNITY
Start: 2022-02-23 | End: 2023-06-22

## 2023-06-22 RX ORDER — BISMUTH SUBSALICYLATE 262 MG/1
262 TABLET, CHEWABLE ORAL 4 TIMES DAILY
Qty: 112 | Refills: 0 | Status: DISCONTINUED | COMMUNITY
Start: 2022-04-18 | End: 2023-06-22

## 2023-06-22 RX ORDER — ASPIRIN 81 MG/1
81 TABLET, COATED ORAL
Qty: 90 | Refills: 1 | Status: DISCONTINUED | COMMUNITY
Start: 2022-11-08 | End: 2023-06-22

## 2023-06-22 RX ORDER — CYCLOBENZAPRINE HYDROCHLORIDE 5 MG/1
5 TABLET, FILM COATED ORAL 3 TIMES DAILY
Qty: 21 | Refills: 1 | Status: DISCONTINUED | COMMUNITY
Start: 2022-02-10 | End: 2023-06-22

## 2023-06-22 RX ORDER — TETRACYCLINE HYDROCHLORIDE 500 MG/1
500 CAPSULE ORAL
Qty: 56 | Refills: 0 | Status: DISCONTINUED | COMMUNITY
Start: 2022-04-18 | End: 2023-06-22

## 2023-06-22 RX ORDER — SULFAMETHOXAZOLE AND TRIMETHOPRIM 800; 160 MG/1; MG/1
800-160 TABLET ORAL
Qty: 6 | Refills: 0 | Status: DISCONTINUED | COMMUNITY
Start: 2022-10-23 | End: 2023-06-22

## 2023-06-22 NOTE — PHYSICAL EXAM
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

## 2023-06-22 NOTE — ASSESSMENT
[FreeTextEntry1] : MRI shows severe L4/5 stenosis, L5/S1 disc osteophyte complex pinching S1.\par \par Discussed diagnosis and treatment plan including PT.\par She has tried conservative tx including PT, nsaids for 3 months without relief. Discussed DANIEL in detail.  Risks include bleeding.  Stop nsaids 2 days before.\par \par She will continue a comprehensive rehabilitation program afterward, as this is important to prevent recurrence of pain.\par continue baclofen prn\par Told to be careful with NSAIDs given age. Watch out for GI bleeding, blood in stool, and stomach irritation.\par \par knee - ice area often\par she will try to do PT.  Taught HEP. \par \par see vascular to check circulation.

## 2023-06-22 NOTE — HISTORY OF PRESENT ILLNESS
[FreeTextEntry1] : Location: back\par Severity: 5/10, worse last few wks\par Duration: few months\par Timing: chronic, hx back pain\par Aggravating Factors: getting up, sitting\par Alleviating Factors: PT\par Associated Symptoms: denies weight loss, fever, chills, change in bowel/bladder habits, weakness, +numbness/tingling, +radiation down right leg\par Prior Studies: MRI\par 3/2022 right L4 and L5 DANIEL \par 10/2022 right L5 and S1 DANIEL - over 60% relief for 8 months\par \par Right knee hurting lately.  Pain with walking and going down stairs.  Swelling in right lower leg.

## 2023-06-22 NOTE — DATA REVIEWED
[FreeTextEntry1] : office xrays of right knee ap, lat, sunrise, show mod/severe medial knee oa.  No gross fracture.

## 2023-07-10 ENCOUNTER — APPOINTMENT (OUTPATIENT)
Dept: INTERNAL MEDICINE | Facility: CLINIC | Age: 73
End: 2023-07-10
Payer: COMMERCIAL

## 2023-07-10 ENCOUNTER — LABORATORY RESULT (OUTPATIENT)
Age: 73
End: 2023-07-10

## 2023-07-10 VITALS
SYSTOLIC BLOOD PRESSURE: 160 MMHG | HEART RATE: 62 BPM | TEMPERATURE: 98.2 F | RESPIRATION RATE: 14 BRPM | HEIGHT: 64 IN | OXYGEN SATURATION: 98 % | BODY MASS INDEX: 22.2 KG/M2 | DIASTOLIC BLOOD PRESSURE: 96 MMHG | WEIGHT: 130 LBS

## 2023-07-10 DIAGNOSIS — F32.A DEPRESSION, UNSPECIFIED: ICD-10-CM

## 2023-07-10 DIAGNOSIS — Z00.00 ENCOUNTER FOR GENERAL ADULT MEDICAL EXAMINATION W/OUT ABNORMAL FINDINGS: ICD-10-CM

## 2023-07-10 DIAGNOSIS — F10.20 ALCOHOL DEPENDENCE, UNCOMPLICATED: ICD-10-CM

## 2023-07-10 PROCEDURE — 36415 COLL VENOUS BLD VENIPUNCTURE: CPT

## 2023-07-10 PROCEDURE — G0439: CPT

## 2023-07-10 RX ORDER — ROSUVASTATIN CALCIUM 20 MG/1
20 TABLET, FILM COATED ORAL
Qty: 90 | Refills: 1 | Status: ACTIVE | COMMUNITY
Start: 2021-08-31 | End: 1900-01-01

## 2023-07-10 RX ORDER — BACLOFEN 5 MG/1
5 TABLET ORAL
Qty: 50 | Refills: 3 | Status: ACTIVE | COMMUNITY
Start: 2022-09-28 | End: 1900-01-01

## 2023-07-10 NOTE — PHYSICAL EXAM
[No Edema] : there was no peripheral edema [Normal] : affect was normal and insight and judgment were intact [de-identified] : right buttocks tenderness.

## 2023-07-10 NOTE — PLAN
[FreeTextEntry1] : \par CAD -strongly encourage following up with Dr Sandoval  \par   - restart statin\par \par Depression- continue wellbutrin-  erstart cymbalta, f/up4 weeks to monitor progress\par - referral for a tehrapist given - Geno Grey LCSW.\par Emphysema -f/up with pulmonary  \par \par Smoking -continue wellbutrin - strongly encourage quitting - can call 311 for supplies as well\par  -smoking cessation counseling performed\par rec repeat LDCT will get her a rx\par f/up 4-6 weeks - in office.\par \par

## 2023-07-10 NOTE — HISTORY OF PRESENT ILLNESS
[de-identified] : This is a 74 yo f with non-obstructive CAD, anxiety, depression, emphysema, smoker.  \par \par Has been feeling low recently.   - does not want to talk with friends. - gets an overwhelming feeling.   - multiple issues.   - disconnected from family.   - \par taking wellbutrin - \par would like to see a therapist.  \par still smoking - 3 cigs a day.  \par drinks 1/2 bottle wine daily.  \par not on a statin \par \par occasoina discomfort with urination\par \par LDCT in 2021\par \par \par Colonoscopy 4 years ago\par - mammo 2021 and another this year.

## 2023-07-11 LAB
ALBUMIN SERPL ELPH-MCNC: 4.4 G/DL
ALP BLD-CCNC: 93 U/L
ALT SERPL-CCNC: 12 U/L
ANION GAP SERPL CALC-SCNC: 11 MMOL/L
APPEARANCE: CLEAR
AST SERPL-CCNC: 20 U/L
BILIRUB SERPL-MCNC: 0.2 MG/DL
BILIRUBIN URINE: NEGATIVE
BLOOD URINE: NEGATIVE
BUN SERPL-MCNC: 12 MG/DL
CALCIUM SERPL-MCNC: 10.1 MG/DL
CHLORIDE SERPL-SCNC: 105 MMOL/L
CHOLEST SERPL-MCNC: 261 MG/DL
CO2 SERPL-SCNC: 28 MMOL/L
COLOR: YELLOW
CREAT SERPL-MCNC: 0.8 MG/DL
EGFR: 77 ML/MIN/1.73M2
ESTIMATED AVERAGE GLUCOSE: 126 MG/DL
GLUCOSE QUALITATIVE U: NEGATIVE MG/DL
GLUCOSE SERPL-MCNC: 94 MG/DL
HBA1C MFR BLD HPLC: 6 %
HDLC SERPL-MCNC: 77 MG/DL
KETONES URINE: NEGATIVE MG/DL
LDLC SERPL CALC-MCNC: 167 MG/DL
LEUKOCYTE ESTERASE URINE: ABNORMAL
NITRITE URINE: NEGATIVE
NONHDLC SERPL-MCNC: 184 MG/DL
PH URINE: 6
POTASSIUM SERPL-SCNC: 4.1 MMOL/L
PROT SERPL-MCNC: 6.9 G/DL
PROTEIN URINE: NORMAL MG/DL
SODIUM SERPL-SCNC: 143 MMOL/L
SPECIFIC GRAVITY URINE: 1.02
TRIGL SERPL-MCNC: 101 MG/DL
TSH SERPL-ACNC: 1.56 UIU/ML
UROBILINOGEN URINE: 0.2 MG/DL

## 2023-07-26 ENCOUNTER — APPOINTMENT (OUTPATIENT)
Dept: PHYSICAL MEDICINE AND REHAB | Facility: CLINIC | Age: 73
End: 2023-07-26
Payer: COMMERCIAL

## 2023-07-26 PROCEDURE — 64484 NJX AA&/STRD TFRM EPI L/S EA: CPT | Mod: RT

## 2023-07-26 PROCEDURE — 64483 NJX AA&/STRD TFRM EPI L/S 1: CPT | Mod: RT

## 2023-07-26 NOTE — PROCEDURE
[de-identified] : indication: pain\par \par Fluoroscopically Guided, Contrast Enhanced, Right L5 and S1 Epidural Steroid Injection\par \par After informed consent, She was placed prone on the fluoroscopy table. Skin over the area was prepped and draped in the usual sterile fashion before being anesthetized with 1% Lidocaine. Then using an oblique approach, a 22 gauge 5 in spinal needle was directed down to the L5/S1 NF.   Needle placement was confirmed with AP fluoroscopic images. After negative aspiration for blood or cerebrospinal fluid, contrast was instilled under live fluoroscopy and an epidurogram was obtained. It showed good epidural flow without any vascular uptake. Then a steroid solution consisting of 20 mg of Kenalog, 1 ml of 0.5% Lidocaine was instilled. \par \par Then using a 22 gauge 3.5 in spinal needle was directed down to the S1 foramen.   Needle placement was confirmed with lateral fluoroscopic images. After negative aspiration for blood or cerebrospinal fluid, contrast was instilled under live fluoroscopy and an epidurogram was obtained. It showed good epidural flow without any vascular uptake. Then a steroid solution consisting of 20 mg of Kenalog, 1.5 ml of 0.5% Lidocaine was instilled. \par \par  She  was discharged in good condition.  Instructions given:  No soaking or bathing for 2 days but can take a shower.  No strenuous exercise for 4 days. \par \par \par \par Manufacture GE\par Omnipaque 240\par NDC [id number]\par Exp 5/4/24\par [id number]\par \par Triamcinolone\par NDC [address]-6\par Exp 6/24\par Lot [id number]\par Manufacture [employer]\par \par \par Lidocaine\par Manufacture [employer]\par NDC 88990-624-22\par Exp 6/24\par [id number]\par \par

## 2023-08-08 ENCOUNTER — APPOINTMENT (OUTPATIENT)
Dept: HEART AND VASCULAR | Facility: CLINIC | Age: 73
End: 2023-08-08
Payer: COMMERCIAL

## 2023-08-08 VITALS
TEMPERATURE: 97.8 F | HEART RATE: 79 BPM | OXYGEN SATURATION: 98 % | HEIGHT: 64 IN | WEIGHT: 135 LBS | SYSTOLIC BLOOD PRESSURE: 148 MMHG | BODY MASS INDEX: 23.05 KG/M2 | DIASTOLIC BLOOD PRESSURE: 95 MMHG

## 2023-08-08 VITALS — SYSTOLIC BLOOD PRESSURE: 128 MMHG | DIASTOLIC BLOOD PRESSURE: 84 MMHG

## 2023-08-08 DIAGNOSIS — R53.83 OTHER FATIGUE: ICD-10-CM

## 2023-08-08 PROCEDURE — 99215 OFFICE O/P EST HI 40 MIN: CPT | Mod: 25

## 2023-08-08 PROCEDURE — 93000 ELECTROCARDIOGRAM COMPLETE: CPT

## 2023-08-08 NOTE — HISTORY OF PRESENT ILLNESS
[FreeTextEntry1] :  72 y/o female with h/o cad s/p RALF midCx, mRCA , predm, smoker/etoh, depression, copd/emphysema who presents for initial evaluation today    notes in past 6 weeks profound fatigue - similar to prior to stents no cp, syncope, lh, edema, palpitations, orthopnea, pnd notes some new sob and kulkarni   former patient Dr. Acevedo   sees therapist for depression sees pulm for copd/emphysema  walks 40 minutes per day eat healthy   restarted statin a month ago - was off for a while  -Cath : ralf mLcx, ralf mRCA,, 50% LAD -Echo : ef 60%, mild con lvh, trace mr/tr  PMH/PSH: cad s/p PCI midCx, mRCA  emphysema/copd depression h/o etoh dependence lumbar radiculopathy h.pylori predm mod hiatal hernia  SH: h/o tobacco - 2 cigs/day now - h/o 1/2 ppd 50 years h/o etoh dependence x 40 years - 1/2 bottle day no drugs works at [employer] single from [place] no children live alone   ALL: nkda   MEDS: asa 81 mg qd wellbutrin 150 mg bid albuterol duloxetine 30 mg qd crestor 20 mg qhs spiriva  FH: mother -  brain aneurysm 75 father -  MI 77 sister - alive, HCM 70 sister - alive, 67

## 2023-08-08 NOTE — DISCUSSION/SUMMARY
[Patient] : the patient [___ Month(s)] : in [unfilled] month(s) [EKG obtained to assist in diagnosis and management of assessed problem(s)] : EKG obtained to assist in diagnosis and management of assessed problem(s) [FreeTextEntry1] :  74 y/o female with h/o cad s/p JOHN midCx, mRCA 2021, predm, smoker/etoh, depression, copd/emphysema who presents for initial evaluation today  -refer for cardiac cath for kulkarni, fatigue -refer for echo for kulkarni -ekg ordered today - nsr, normal intervals, possible IMI radha -will check lipids next month -labs 2023 reviewed -close monitoring bp -pulm f/up for copd/emphysema -counseled on cvd risk factors, smoking etoh cessation -continue asa, statin -f/up 2 months for cad, lipids  I have spent 60 minutes reviewing labs, records, tests and discussed cad, cvd risk factors

## 2023-08-09 VITALS
WEIGHT: 134.92 LBS | HEIGHT: 65 IN | RESPIRATION RATE: 15 BRPM | OXYGEN SATURATION: 98 % | TEMPERATURE: 98 F | HEART RATE: 68 BPM | SYSTOLIC BLOOD PRESSURE: 138 MMHG | DIASTOLIC BLOOD PRESSURE: 79 MMHG

## 2023-08-09 RX ORDER — CHLORHEXIDINE GLUCONATE 213 G/1000ML
1 SOLUTION TOPICAL ONCE
Refills: 0 | Status: DISCONTINUED | OUTPATIENT
Start: 2023-08-17 | End: 2023-08-18

## 2023-08-09 NOTE — H&P ADULT - NSHPLABSRESULTS_GEN_ALL_CORE
LABS:                          12.1   4.89  )-----------( 203      ( 17 Aug 2023 11:49 )             35.7     08-17    140  |  105  |  13  ----------------------------<  90  4.4   |  25  |  0.79    Ca    8.9      17 Aug 2023 11:49  Mg     2.2     08-17    TPro  6.2  /  Alb  3.8  /  TBili  0.2  /  DBili  x   /  AST  32  /  ALT  50<H>  /  AlkPhos  90  08-17    LIVER FUNCTIONS - ( 17 Aug 2023 11:49 )  Alb: 3.8 g/dL / Pro: 6.2 g/dL / ALK PHOS: 90 U/L / ALT: 50 U/L / AST: 32 U/L / GGT: x           PT/INR - ( 17 Aug 2023 11:49 )   PT: 11.0 sec;   INR: 0.96          PTT - ( 17 Aug 2023 11:49 )  PTT:30.3 sec  Urinalysis Basic - ( 17 Aug 2023 11:49 )    Color: x / Appearance: x / SG: x / pH: x  Gluc: 90 mg/dL / Ketone: x  / Bili: x / Urobili: x   Blood: x / Protein: x / Nitrite: x   Leuk Esterase: x / RBC: x / WBC x   Sq Epi: x / Non Sq Epi: x / Bacteria: x      EKG 8/17/23: NSR at 68 bpm

## 2023-08-09 NOTE — H&P ADULT - ASSESSMENT
73F, active smoker and daily ETOH use, w/ PMHx of HTN, HLD, CAD s/p PCI (JOHN mLCx and JOHN mRCA 10/2021) c/b RFA perforation and s/p PTA/BMS, pre-DM, COPD/Emphysema, and Depression, who presents to [facility] for cardiac catheterization with possible intervention in light of patient's risk factors, CCS class III anginal-equivalent symptoms and significant CAD hx.    ASA III				Mallampati class: II	            Anginal Class: III    - VSS  - H/H stable, patient denies BRBPR, melena, hematuria, or further bleeding.   -  Sedation Plan: Moderate   - Patient Is Suitable Candidate For Sedation?  Yes  - Cath Order Entered: Yes  - DAPT LOAD: Patient currently takes ASA 81 mg daily (last dose 8/16/23 AM); ordered ASA 81 mg PO x1. Patient states that she is no longer taking Plavix 75 mg daily; ordered Plavix 600 mg PO x1.   - PRE-CATH FLUIDS: Yes; patient is euvolemic on exam; last EF WNL (TTE in 2021); Cr 0.79; ordered IV  cc bolus x1 over 30 mins followed by IV NS 75 cc/hr x 2 hours per pre-cath IV fluid hydration protocol.   - ALLERGY: No indication for pre-cath steroid prophylaxis   - Informed consent is in the patient's chart.    Risks Benefits Annotation:     CARDIAC CATH: Risks & benefits of procedure and sedation and risks and benefits for the alternative therapy have been explained to the patient and/or HCP in layman’s terms including but not limited to: allergic reaction, bleeding, infection, arrhythmia, respiratory compromise, renal and vascular compromise, limb damage, MI, CVA, emergent CABG/Vascular Surgery and death. Informed consent obtained and in chart. 73F, active smoker and daily ETOH use, w/ PMHx of HTN, HLD, CAD s/p PCI (JOHN mLCx and JOHN mRCA 10/2021) c/b RFA perforation and s/p PTA/BMS, pre-DM, COPD/Emphysema, and Depression, who presents to [facility] for cardiac catheterization with possible intervention in light of patient's risk factors, CCS class III anginal-equivalent symptoms and significant CAD hx.    ASA III				Mallampati class: II	            Anginal Class: III    - VSS  - H/H stable, patient denies BRBPR, melena, hematuria, or further bleeding.   -  Sedation Plan: Moderate   - Patient Is Suitable Candidate For Sedation?  Yes  - Cath Order Entered: Yes  - DAPT LOAD: Patient currently takes ASA 81 mg daily (last dose 8/16/23 AM); ordered ASA 81 mg PO x1. Patient states that she is no longer taking Plavix 75 mg daily; ordered Plavix 600 mg PO x1.   - PRE-CATH FLUIDS: Yes; patient is euvolemic on exam; last EF WNL (TTE in 2021); Cr 0.79; ordered IV  cc bolus x1 over 30 mins followed by IV NS 75 cc/hr x 2 hours per pre-cath IV fluid hydration protocol.   - ALLERGY: No indication for pre-cath steroid prophylaxis   - Informed consent is in the patient's chart.  - Patient states that she is not currently taking BP medications.     Risks Benefits Annotation:     CARDIAC CATH: Risks & benefits of procedure and sedation and risks and benefits for the alternative therapy have been explained to the patient and/or HCP in layman’s terms including but not limited to: allergic reaction, bleeding, infection, arrhythmia, respiratory compromise, renal and vascular compromise, limb damage, MI, CVA, emergent CABG/Vascular Surgery and death. Informed consent obtained and in chart.

## 2023-08-09 NOTE — H&P ADULT - NSICDXPASTMEDICALHX_GEN_ALL_CORE_FT
PAST MEDICAL HISTORY:  CAD (coronary artery disease)     Emphysema/COPD     Hiatal hernia     HLD (hyperlipidemia)     HTN (hypertension)

## 2023-08-09 NOTE — H&P ADULT - NSICDXFAMILYHX_GEN_ALL_CORE_FT
FAMILY HISTORY:  Father  Still living? No  FH: myocardial infarction, Age at diagnosis: 71-80    Mother  Still living? No  Family history of brain aneurysm, Age at diagnosis: 71-80

## 2023-08-09 NOTE — H&P ADULT - PATIENT'S SEXUAL ORIENTATION
Consent (Marginal Mandibular)/Introductory Paragraph: The rationale for Mohs was explained to the patient and consent was obtained. The risks, benefits and alternatives to therapy were discussed in detail. Specifically, the risks of damage to the marginal mandibular branch of the facial nerve, infection, scarring, bleeding, prolonged wound healing, incomplete removal, allergy to anesthesia, and recurrence were addressed. Prior to the procedure, the treatment site was clearly identified and confirmed by the patient. All components of Universal Protocol/PAUSE Rule completed. Heterosexual

## 2023-08-09 NOTE — H&P ADULT - HISTORY OF PRESENT ILLNESS
Cardiologist - Dr. Mcclendon  Pharmacy -  Escort -    73F, active smoker and daily ETOH use, w/ PMHx of HTN, HLD, CAD s/p PCI (JOHN mLCx and JOHN mRCA 10/2021) c/b RFA perforation and s/p PTA/BMS, pre-DM, COPD/Emphysema, and Depression, initially presented to outpt cardiologist Dr. Mcclendon c/o profound fatigue x 6 weeks, similar to prior PCI. Pt also reports new WEBB, ___. She denies any ___ CP, palpitations, dizziness, syncope, diaphoresis, LE edema, orthopnea, PND, N/V, fever, chills or recent sick contact.     Cardiac Cath 10/6/21 @ [facility]: JOHN mLCx (80%), JOHN mRCA (80%); LM normal, pLAD 50% (iFR 0.93), c/b RFA perforation during PC deployment and s/p PTA/BMS of RFA via LFA.TTE 2021 (per MD note): normal LVEF, mild LVH, trace MR/TR.    In light of pts risk factors, CCS class __ anginal symptoms and significant CAD hx, pt now presents to [facility] for recommended cardiac catheterization with possible intervention if clinically indicated.  Cardiologist - Dr. Mcclendon  Pharmacy - [facility], [facility] [place] [id number]  Escort - sister    73F, active smoker and daily ETOH use, w/ PMHx of HTN, HLD, CAD s/p PCI (JOHN mLCx and JOHN mRCA 10/2021) c/b RFA perforation and s/p PTA/BMS, pre-DM, COPD/Emphysema, and Depression, initially presented to outpt cardiologist Dr. Mcclendon c/o profound fatigue x 6 weeks, similar to prior PCI. Pt also reports new WEBB. She denies any CP, palpitations, dizziness, syncope, diaphoresis, LE edema, orthopnea, PND, N/V, fever, chills or recent sick contact.     Cardiac Cath 10/6/21 @ [facility]: JOHN mLCx (80%), JOHN mRCA (80%); LM normal, pLAD 50% (iFR 0.93), c/b RFA perforation during PC deployment and s/p PTA/BMS of RFA via LFA.TTE 2021 (per MD note): normal LVEF, mild LVH, trace MR/TR.    In light of patient's risk factors, CCS class III anginal-equivalent symptoms and significant CAD hx, pt now presents to [facility] for recommended cardiac catheterization with possible intervention if clinically indicated.  Cardiologist - Dr. Mcclendon  Pharmacy - [facility], [address] [place] [id number]  Escort - sister    73F, active smoker and daily ETOH use, w/ PMHx of HTN, HLD, CAD s/p PCI (JOHN mLCx and JOHN mRCA 10/2021) c/b RFA perforation and s/p PTA/BMS, pre-DM, COPD/Emphysema, and Depression, initially presented to outpatient cardiologist Dr. Mcclendon c/o profound fatigue x 6 weeks, similar to prior PCI. Pt also reports new WEBB. She denies any CP, palpitations, dizziness, syncope, diaphoresis, LE edema, orthopnea, PND, N/V, fever, chills or recent sick contact.     Cardiac Cath 10/6/21 @ [facility]: JOHN mLCx (80%), JOHN mRCA (80%); LM normal, pLAD 50% (iFR 0.93), c/b RFA perforation during PC deployment and s/p PTA/BMS of RFA via LFA.TTE 2021 (per MD note): normal LVEF, mild LVH, trace MR/TR.    In light of patient's risk factors, CCS class III anginal-equivalent symptoms and significant CAD hx, pt now presents to [facility] for recommended cardiac catheterization with possible intervention if clinically indicated.  Cardiologist - Dr. Mcclendon  Pharmacy - [facility], [address] [place] [id number]  Escort - sister    73F, active smoker and daily ETOH use (2 glasses of wine per day, last drink yesterday), w/ PMHx of HTN, HLD, CAD s/p PCI (JOHN mLCx and JOHN mRCA 10/2021) c/b RFA perforation and s/p PTA/BMS, pre-DM, COPD/Emphysema, and Depression, initially presented to outpatient cardiologist Dr. Mcclendon c/o profound fatigue x 6 weeks, similar to prior PCI. Pt also reports new WEBB. She denies any CP, palpitations, dizziness, syncope, diaphoresis, LE edema, orthopnea, PND, N/V, fever, chills or recent sick contact.     Cardiac Cath 10/6/21 @ [facility]: JOHN mLCx (80%), JOHN mRCA (80%); LM normal, pLAD 50% (iFR 0.93), c/b RFA perforation during PC deployment and s/p PTA/BMS of RFA via LFA.TTE 2021 (per MD note): normal LVEF, mild LVH, trace MR/TR.    In light of patient's risk factors, CCS class III anginal-equivalent symptoms and significant CAD hx, pt now presents to [facility] for recommended cardiac catheterization with possible intervention if clinically indicated.

## 2023-08-17 ENCOUNTER — INPATIENT (INPATIENT)
Facility: HOSPITAL | Age: 73
LOS: 0 days | Discharge: HOME | DRG: 247 | End: 2023-08-18
Attending: INTERNAL MEDICINE | Admitting: INTERNAL MEDICINE
Payer: COMMERCIAL

## 2023-08-17 LAB
ALBUMIN SERPL ELPH-MCNC: 3.8 G/DL — SIGNIFICANT CHANGE UP (ref 3.3–5)
ALP SERPL-CCNC: 90 U/L — SIGNIFICANT CHANGE UP (ref 40–120)
ALT FLD-CCNC: 50 U/L — HIGH (ref 10–45)
ANION GAP SERPL CALC-SCNC: 10 MMOL/L — SIGNIFICANT CHANGE UP (ref 5–17)
APTT BLD: 30.3 SEC — SIGNIFICANT CHANGE UP (ref 24.5–35.6)
AST SERPL-CCNC: 32 U/L — SIGNIFICANT CHANGE UP (ref 10–40)
BASOPHILS # BLD AUTO: 0.02 K/UL — SIGNIFICANT CHANGE UP (ref 0–0.2)
BASOPHILS NFR BLD AUTO: 0.4 % — SIGNIFICANT CHANGE UP (ref 0–2)
BILIRUB SERPL-MCNC: 0.2 MG/DL — SIGNIFICANT CHANGE UP (ref 0.2–1.2)
BUN SERPL-MCNC: 13 MG/DL — SIGNIFICANT CHANGE UP (ref 7–23)
CALCIUM SERPL-MCNC: 8.9 MG/DL — SIGNIFICANT CHANGE UP (ref 8.4–10.5)
CHLORIDE SERPL-SCNC: 105 MMOL/L — SIGNIFICANT CHANGE UP (ref 96–108)
CHOLEST SERPL-MCNC: 130 MG/DL — SIGNIFICANT CHANGE UP
CK MB CFR SERPL CALC: 1.5 NG/ML — SIGNIFICANT CHANGE UP (ref 0–6.7)
CK SERPL-CCNC: 94 U/L — SIGNIFICANT CHANGE UP (ref 25–170)
CO2 SERPL-SCNC: 25 MMOL/L — SIGNIFICANT CHANGE UP (ref 22–31)
CREAT SERPL-MCNC: 0.79 MG/DL — SIGNIFICANT CHANGE UP (ref 0.5–1.3)
EGFR: 78 ML/MIN/1.73M2 — SIGNIFICANT CHANGE UP
EOSINOPHIL # BLD AUTO: 0.08 K/UL — SIGNIFICANT CHANGE UP (ref 0–0.5)
EOSINOPHIL NFR BLD AUTO: 1.6 % — SIGNIFICANT CHANGE UP (ref 0–6)
GLUCOSE SERPL-MCNC: 90 MG/DL — SIGNIFICANT CHANGE UP (ref 70–99)
HCT VFR BLD CALC: 35.7 % — SIGNIFICANT CHANGE UP (ref 34.5–45)
HDLC SERPL-MCNC: 60 MG/DL — SIGNIFICANT CHANGE UP
HGB BLD-MCNC: 12.1 G/DL — SIGNIFICANT CHANGE UP (ref 11.5–15.5)
IMM GRANULOCYTES NFR BLD AUTO: 0.2 % — SIGNIFICANT CHANGE UP (ref 0–0.9)
INR BLD: 0.96 — SIGNIFICANT CHANGE UP (ref 0.85–1.18)
LIPID PNL WITH DIRECT LDL SERPL: 60 MG/DL — SIGNIFICANT CHANGE UP
LYMPHOCYTES # BLD AUTO: 1.24 K/UL — SIGNIFICANT CHANGE UP (ref 1–3.3)
LYMPHOCYTES # BLD AUTO: 25.4 % — SIGNIFICANT CHANGE UP (ref 13–44)
MAGNESIUM SERPL-MCNC: 2.2 MG/DL — SIGNIFICANT CHANGE UP (ref 1.6–2.6)
MCHC RBC-ENTMCNC: 30.4 PG — SIGNIFICANT CHANGE UP (ref 27–34)
MCHC RBC-ENTMCNC: 33.9 GM/DL — SIGNIFICANT CHANGE UP (ref 32–36)
MCV RBC AUTO: 89.7 FL — SIGNIFICANT CHANGE UP (ref 80–100)
MONOCYTES # BLD AUTO: 0.47 K/UL — SIGNIFICANT CHANGE UP (ref 0–0.9)
MONOCYTES NFR BLD AUTO: 9.6 % — SIGNIFICANT CHANGE UP (ref 2–14)
NEUTROPHILS # BLD AUTO: 3.07 K/UL — SIGNIFICANT CHANGE UP (ref 1.8–7.4)
NEUTROPHILS NFR BLD AUTO: 62.8 % — SIGNIFICANT CHANGE UP (ref 43–77)
NON HDL CHOLESTEROL: 70 MG/DL — SIGNIFICANT CHANGE UP
NRBC # BLD: 0 /100 WBCS — SIGNIFICANT CHANGE UP (ref 0–0)
PLATELET # BLD AUTO: 203 K/UL — SIGNIFICANT CHANGE UP (ref 150–400)
POTASSIUM SERPL-MCNC: 4.4 MMOL/L — SIGNIFICANT CHANGE UP (ref 3.5–5.3)
POTASSIUM SERPL-SCNC: 4.4 MMOL/L — SIGNIFICANT CHANGE UP (ref 3.5–5.3)
PROT SERPL-MCNC: 6.2 G/DL — SIGNIFICANT CHANGE UP (ref 6–8.3)
PROTHROM AB SERPL-ACNC: 11 SEC — SIGNIFICANT CHANGE UP (ref 9.5–13)
RBC # BLD: 3.98 M/UL — SIGNIFICANT CHANGE UP (ref 3.8–5.2)
RBC # FLD: 13.5 % — SIGNIFICANT CHANGE UP (ref 10.3–14.5)
SODIUM SERPL-SCNC: 140 MMOL/L — SIGNIFICANT CHANGE UP (ref 135–145)
TRIGL SERPL-MCNC: 48 MG/DL — SIGNIFICANT CHANGE UP
WBC # BLD: 4.89 K/UL — SIGNIFICANT CHANGE UP (ref 3.8–10.5)
WBC # FLD AUTO: 4.89 K/UL — SIGNIFICANT CHANGE UP (ref 3.8–10.5)

## 2023-08-17 PROCEDURE — 93010 ELECTROCARDIOGRAM REPORT: CPT | Mod: 59

## 2023-08-17 PROCEDURE — 93458 L HRT ARTERY/VENTRICLE ANGIO: CPT | Mod: 26,59

## 2023-08-17 PROCEDURE — 99152 MOD SED SAME PHYS/QHP 5/>YRS: CPT

## 2023-08-17 PROCEDURE — 92928 PRQ TCAT PLMT NTRAC ST 1 LES: CPT | Mod: LD

## 2023-08-17 RX ORDER — DULOXETINE HYDROCHLORIDE 30 MG/1
1 CAPSULE, DELAYED RELEASE ORAL
Refills: 0 | DISCHARGE

## 2023-08-17 RX ORDER — SODIUM CHLORIDE 9 MG/ML
500 INJECTION INTRAMUSCULAR; INTRAVENOUS; SUBCUTANEOUS
Refills: 0 | Status: DISCONTINUED | OUTPATIENT
Start: 2023-08-17 | End: 2023-08-18

## 2023-08-17 RX ORDER — BUPROPION HYDROCHLORIDE 150 MG/1
300 TABLET, EXTENDED RELEASE ORAL DAILY
Refills: 0 | Status: DISCONTINUED | OUTPATIENT
Start: 2023-08-17 | End: 2023-08-18

## 2023-08-17 RX ORDER — SODIUM CHLORIDE 9 MG/ML
500 INJECTION INTRAMUSCULAR; INTRAVENOUS; SUBCUTANEOUS
Refills: 0 | Status: DISCONTINUED | OUTPATIENT
Start: 2023-08-17 | End: 2023-08-17

## 2023-08-17 RX ORDER — CLOPIDOGREL BISULFATE 75 MG/1
75 TABLET, FILM COATED ORAL DAILY
Refills: 0 | Status: DISCONTINUED | OUTPATIENT
Start: 2023-08-18 | End: 2023-08-18

## 2023-08-17 RX ORDER — ALBUTEROL 90 UG/1
2 AEROSOL, METERED ORAL
Refills: 0 | DISCHARGE

## 2023-08-17 RX ORDER — ATORVASTATIN CALCIUM 80 MG/1
80 TABLET, FILM COATED ORAL AT BEDTIME
Refills: 0 | Status: DISCONTINUED | OUTPATIENT
Start: 2023-08-17 | End: 2023-08-18

## 2023-08-17 RX ORDER — SODIUM CHLORIDE 9 MG/ML
250 INJECTION INTRAMUSCULAR; INTRAVENOUS; SUBCUTANEOUS ONCE
Refills: 0 | Status: COMPLETED | OUTPATIENT
Start: 2023-08-17 | End: 2023-08-17

## 2023-08-17 RX ORDER — BACLOFEN 100 %
5 POWDER (GRAM) MISCELLANEOUS EVERY 12 HOURS
Refills: 0 | Status: DISCONTINUED | OUTPATIENT
Start: 2023-08-17 | End: 2023-08-18

## 2023-08-17 RX ORDER — HYDRALAZINE HCL 50 MG
25 TABLET ORAL ONCE
Refills: 0 | Status: COMPLETED | OUTPATIENT
Start: 2023-08-17 | End: 2023-08-17

## 2023-08-17 RX ORDER — ASPIRIN/CALCIUM CARB/MAGNESIUM 324 MG
81 TABLET ORAL DAILY
Refills: 0 | Status: DISCONTINUED | OUTPATIENT
Start: 2023-08-18 | End: 2023-08-18

## 2023-08-17 RX ORDER — BACLOFEN 100 %
1 POWDER (GRAM) MISCELLANEOUS
Refills: 0 | DISCHARGE

## 2023-08-17 RX ORDER — DOCUSATE SODIUM 100 MG
1 CAPSULE ORAL
Refills: 0 | DISCHARGE

## 2023-08-17 RX ORDER — CLOPIDOGREL BISULFATE 75 MG/1
600 TABLET, FILM COATED ORAL ONCE
Refills: 0 | Status: COMPLETED | OUTPATIENT
Start: 2023-08-17 | End: 2023-08-17

## 2023-08-17 RX ORDER — DULOXETINE HYDROCHLORIDE 30 MG/1
20 CAPSULE, DELAYED RELEASE ORAL DAILY
Refills: 0 | Status: DISCONTINUED | OUTPATIENT
Start: 2023-08-17 | End: 2023-08-18

## 2023-08-17 RX ORDER — MELOXICAM 15 MG/1
1 TABLET ORAL
Refills: 0 | DISCHARGE

## 2023-08-17 RX ORDER — LINACLOTIDE 145 UG/1
1 CAPSULE, GELATIN COATED ORAL
Refills: 0 | DISCHARGE

## 2023-08-17 RX ORDER — TIOTROPIUM BROMIDE 18 UG/1
1 CAPSULE ORAL; RESPIRATORY (INHALATION)
Refills: 0 | DISCHARGE

## 2023-08-17 RX ORDER — BUPROPION HYDROCHLORIDE 150 MG/1
1 TABLET, EXTENDED RELEASE ORAL
Qty: 0 | Refills: 0 | DISCHARGE

## 2023-08-17 RX ORDER — ASPIRIN/CALCIUM CARB/MAGNESIUM 324 MG
81 TABLET ORAL ONCE
Refills: 0 | Status: COMPLETED | OUTPATIENT
Start: 2023-08-17 | End: 2023-08-17

## 2023-08-17 RX ORDER — ALBUTEROL 90 UG/1
2 AEROSOL, METERED ORAL EVERY 6 HOURS
Refills: 0 | Status: DISCONTINUED | OUTPATIENT
Start: 2023-08-17 | End: 2023-08-18

## 2023-08-17 RX ADMIN — Medication 81 MILLIGRAM(S): at 13:07

## 2023-08-17 RX ADMIN — CLOPIDOGREL BISULFATE 600 MILLIGRAM(S): 75 TABLET, FILM COATED ORAL at 13:07

## 2023-08-17 RX ADMIN — SODIUM CHLORIDE 500 MILLILITER(S): 9 INJECTION INTRAMUSCULAR; INTRAVENOUS; SUBCUTANEOUS at 13:07

## 2023-08-17 RX ADMIN — ATORVASTATIN CALCIUM 80 MILLIGRAM(S): 80 TABLET, FILM COATED ORAL at 21:14

## 2023-08-17 RX ADMIN — DULOXETINE HYDROCHLORIDE 20 MILLIGRAM(S): 30 CAPSULE, DELAYED RELEASE ORAL at 16:35

## 2023-08-17 RX ADMIN — BUPROPION HYDROCHLORIDE 300 MILLIGRAM(S): 150 TABLET, EXTENDED RELEASE ORAL at 16:35

## 2023-08-17 RX ADMIN — Medication 25 MILLIGRAM(S): at 16:16

## 2023-08-17 RX ADMIN — SODIUM CHLORIDE 75 MILLILITER(S): 9 INJECTION INTRAMUSCULAR; INTRAVENOUS; SUBCUTANEOUS at 13:07

## 2023-08-17 RX ADMIN — SODIUM CHLORIDE 180 MILLILITER(S): 9 INJECTION INTRAMUSCULAR; INTRAVENOUS; SUBCUTANEOUS at 16:03

## 2023-08-17 NOTE — PATIENT PROFILE ADULT - FALL HARM RISK - HARM RISK INTERVENTIONS

## 2023-08-17 NOTE — PROVIDER CONTACT NOTE (OTHER) - ASSESSMENT
Pt aox4, anxious, Denies headache. Asymptomatic of hypertensive emergency. Pt resting comfortably in bed

## 2023-08-18 ENCOUNTER — TRANSCRIPTION ENCOUNTER (OUTPATIENT)
Age: 73
End: 2023-08-18

## 2023-08-18 VITALS
RESPIRATION RATE: 18 BRPM | TEMPERATURE: 98 F | DIASTOLIC BLOOD PRESSURE: 92 MMHG | HEART RATE: 78 BPM | SYSTOLIC BLOOD PRESSURE: 157 MMHG | OXYGEN SATURATION: 97 %

## 2023-08-18 PROBLEM — I10 ESSENTIAL (PRIMARY) HYPERTENSION: Chronic | Status: ACTIVE | Noted: 2023-08-09

## 2023-08-18 LAB
ALBUMIN SERPL ELPH-MCNC: 3.7 G/DL — SIGNIFICANT CHANGE UP (ref 3.3–5)
ALP SERPL-CCNC: 83 U/L — SIGNIFICANT CHANGE UP (ref 40–120)
ALT FLD-CCNC: 40 U/L — SIGNIFICANT CHANGE UP (ref 10–45)
ANION GAP SERPL CALC-SCNC: 10 MMOL/L — SIGNIFICANT CHANGE UP (ref 5–17)
AST SERPL-CCNC: 24 U/L — SIGNIFICANT CHANGE UP (ref 10–40)
BASOPHILS # BLD AUTO: 0.02 K/UL — SIGNIFICANT CHANGE UP (ref 0–0.2)
BASOPHILS NFR BLD AUTO: 0.4 % — SIGNIFICANT CHANGE UP (ref 0–2)
BILIRUB SERPL-MCNC: 0.3 MG/DL — SIGNIFICANT CHANGE UP (ref 0.2–1.2)
BUN SERPL-MCNC: 10 MG/DL — SIGNIFICANT CHANGE UP (ref 7–23)
CALCIUM SERPL-MCNC: 8.9 MG/DL — SIGNIFICANT CHANGE UP (ref 8.4–10.5)
CHLORIDE SERPL-SCNC: 106 MMOL/L — SIGNIFICANT CHANGE UP (ref 96–108)
CO2 SERPL-SCNC: 24 MMOL/L — SIGNIFICANT CHANGE UP (ref 22–31)
CREAT SERPL-MCNC: 0.78 MG/DL — SIGNIFICANT CHANGE UP (ref 0.5–1.3)
EGFR: 80 ML/MIN/1.73M2 — SIGNIFICANT CHANGE UP
EOSINOPHIL # BLD AUTO: 0.09 K/UL — SIGNIFICANT CHANGE UP (ref 0–0.5)
EOSINOPHIL NFR BLD AUTO: 1.7 % — SIGNIFICANT CHANGE UP (ref 0–6)
GLUCOSE SERPL-MCNC: 160 MG/DL — HIGH (ref 70–99)
HCT VFR BLD CALC: 37.9 % — SIGNIFICANT CHANGE UP (ref 34.5–45)
HGB BLD-MCNC: 12.3 G/DL — SIGNIFICANT CHANGE UP (ref 11.5–15.5)
IMM GRANULOCYTES NFR BLD AUTO: 0.2 % — SIGNIFICANT CHANGE UP (ref 0–0.9)
LYMPHOCYTES # BLD AUTO: 0.95 K/UL — LOW (ref 1–3.3)
LYMPHOCYTES # BLD AUTO: 18.2 % — SIGNIFICANT CHANGE UP (ref 13–44)
MAGNESIUM SERPL-MCNC: 2.2 MG/DL — SIGNIFICANT CHANGE UP (ref 1.6–2.6)
MCHC RBC-ENTMCNC: 29.6 PG — SIGNIFICANT CHANGE UP (ref 27–34)
MCHC RBC-ENTMCNC: 32.5 GM/DL — SIGNIFICANT CHANGE UP (ref 32–36)
MCV RBC AUTO: 91.3 FL — SIGNIFICANT CHANGE UP (ref 80–100)
MONOCYTES # BLD AUTO: 0.38 K/UL — SIGNIFICANT CHANGE UP (ref 0–0.9)
MONOCYTES NFR BLD AUTO: 7.3 % — SIGNIFICANT CHANGE UP (ref 2–14)
NEUTROPHILS # BLD AUTO: 3.78 K/UL — SIGNIFICANT CHANGE UP (ref 1.8–7.4)
NEUTROPHILS NFR BLD AUTO: 72.2 % — SIGNIFICANT CHANGE UP (ref 43–77)
NRBC # BLD: 0 /100 WBCS — SIGNIFICANT CHANGE UP (ref 0–0)
PLATELET # BLD AUTO: 212 K/UL — SIGNIFICANT CHANGE UP (ref 150–400)
POTASSIUM SERPL-MCNC: 4.2 MMOL/L — SIGNIFICANT CHANGE UP (ref 3.5–5.3)
POTASSIUM SERPL-SCNC: 4.2 MMOL/L — SIGNIFICANT CHANGE UP (ref 3.5–5.3)
PROT SERPL-MCNC: 6.2 G/DL — SIGNIFICANT CHANGE UP (ref 6–8.3)
RBC # BLD: 4.15 M/UL — SIGNIFICANT CHANGE UP (ref 3.8–5.2)
RBC # FLD: 13.9 % — SIGNIFICANT CHANGE UP (ref 10.3–14.5)
SODIUM SERPL-SCNC: 140 MMOL/L — SIGNIFICANT CHANGE UP (ref 135–145)
WBC # BLD: 5.23 K/UL — SIGNIFICANT CHANGE UP (ref 3.8–10.5)
WBC # FLD AUTO: 5.23 K/UL — SIGNIFICANT CHANGE UP (ref 3.8–10.5)

## 2023-08-18 PROCEDURE — C1725: CPT

## 2023-08-18 PROCEDURE — 82553 CREATINE MB FRACTION: CPT

## 2023-08-18 PROCEDURE — 85025 COMPLETE CBC W/AUTO DIFF WBC: CPT

## 2023-08-18 PROCEDURE — 99239 HOSP IP/OBS DSCHRG MGMT >30: CPT

## 2023-08-18 PROCEDURE — 80053 COMPREHEN METABOLIC PANEL: CPT

## 2023-08-18 PROCEDURE — C1769: CPT

## 2023-08-18 PROCEDURE — 83735 ASSAY OF MAGNESIUM: CPT

## 2023-08-18 PROCEDURE — C1760: CPT

## 2023-08-18 PROCEDURE — C1874: CPT

## 2023-08-18 PROCEDURE — 36415 COLL VENOUS BLD VENIPUNCTURE: CPT

## 2023-08-18 PROCEDURE — C1894: CPT

## 2023-08-18 PROCEDURE — 85610 PROTHROMBIN TIME: CPT

## 2023-08-18 PROCEDURE — 80061 LIPID PANEL: CPT

## 2023-08-18 PROCEDURE — 85730 THROMBOPLASTIN TIME PARTIAL: CPT

## 2023-08-18 PROCEDURE — 82550 ASSAY OF CK (CPK): CPT

## 2023-08-18 PROCEDURE — C1887: CPT

## 2023-08-18 PROCEDURE — 93005 ELECTROCARDIOGRAM TRACING: CPT

## 2023-08-18 RX ORDER — NICOTINE POLACRILEX 2 MG
1 GUM BUCCAL
Qty: 1 | Refills: 0
Start: 2023-08-18 | End: 2023-09-07

## 2023-08-18 RX ORDER — ASPIRIN/CALCIUM CARB/MAGNESIUM 324 MG
1 TABLET ORAL
Qty: 30 | Refills: 11
Start: 2023-08-18 | End: 2024-08-11

## 2023-08-18 RX ORDER — ROSUVASTATIN CALCIUM 5 MG/1
1 TABLET ORAL
Qty: 30 | Refills: 3
Start: 2023-08-18 | End: 2023-12-15

## 2023-08-18 RX ORDER — ROSUVASTATIN CALCIUM 5 MG/1
1 TABLET ORAL
Refills: 0 | DISCHARGE

## 2023-08-18 RX ORDER — CARVEDILOL PHOSPHATE 80 MG/1
1 CAPSULE, EXTENDED RELEASE ORAL
Qty: 60 | Refills: 3
Start: 2023-08-18 | End: 2023-12-15

## 2023-08-18 RX ORDER — NICOTINE POLACRILEX 2 MG
1 GUM BUCCAL DAILY
Refills: 0 | Status: DISCONTINUED | OUTPATIENT
Start: 2023-08-18 | End: 2023-08-18

## 2023-08-18 RX ORDER — CLOPIDOGREL BISULFATE 75 MG/1
1 TABLET, FILM COATED ORAL
Qty: 30 | Refills: 11
Start: 2023-08-18 | End: 2024-08-11

## 2023-08-18 RX ORDER — CARVEDILOL PHOSPHATE 80 MG/1
6.25 CAPSULE, EXTENDED RELEASE ORAL EVERY 12 HOURS
Refills: 0 | Status: DISCONTINUED | OUTPATIENT
Start: 2023-08-18 | End: 2023-08-18

## 2023-08-18 RX ORDER — ACETAMINOPHEN 500 MG
650 TABLET ORAL ONCE
Refills: 0 | Status: COMPLETED | OUTPATIENT
Start: 2023-08-18 | End: 2023-08-18

## 2023-08-18 RX ADMIN — CARVEDILOL PHOSPHATE 6.25 MILLIGRAM(S): 80 CAPSULE, EXTENDED RELEASE ORAL at 09:29

## 2023-08-18 RX ADMIN — BUPROPION HYDROCHLORIDE 300 MILLIGRAM(S): 150 TABLET, EXTENDED RELEASE ORAL at 10:40

## 2023-08-18 RX ADMIN — Medication 81 MILLIGRAM(S): at 10:40

## 2023-08-18 RX ADMIN — Medication 650 MILLIGRAM(S): at 07:13

## 2023-08-18 RX ADMIN — CLOPIDOGREL BISULFATE 75 MILLIGRAM(S): 75 TABLET, FILM COATED ORAL at 10:41

## 2023-08-18 RX ADMIN — Medication 5 MILLIGRAM(S): at 00:11

## 2023-08-18 NOTE — DISCHARGE NOTE NURSING/CASE MANAGEMENT/SOCIAL WORK - NSDCPEWEB_GEN_ALL_CORE
[facility] website --- [email or web address] website --- [email or web address]

## 2023-08-18 NOTE — DISCHARGE NOTE PROVIDER - NSDCCPCAREPLAN_GEN_ALL_CORE_FT
PRINCIPAL DISCHARGE DIAGNOSIS  Diagnosis: CAD (coronary artery disease)  Assessment and Plan of Treatment: You were found to have blockages in the arteries of your heart, also known as Coronary Artery Disease. You underwent a cardiac catheterization on 8/17/23 and received a stent to the Left Anterior Descending artery.  PLEASE CONTINUE ASPIRIN 81MG DAILY AND PLAVIX 75MG DAILY. DO NOT STOP THESE MEDICATIONS FOR ANY REASON AS THEY ARE KEEPING YOUR STENT OPEN AND PREVENTING A HEART ATTACK.   Avoid strenuous activity or heavy lifting anything more than 5lbs for the next five days. Do not take a bath or swim for the next five days; you may shower. For any bleeding or hematoma formation (hardened blood collection under the skin) at the access site of your Left Groin please hold pressure and go to the emergency room. Please follow up with Dr. Arvizu in 1-2 weeks. For recurrent chest pain, please call your doctor or go to the emergency room.      SECONDARY DISCHARGE DIAGNOSES  Diagnosis: HTN (hypertension)  Assessment and Plan of Treatment: You have a history of elevated blood pressure and you should continue your blood pressure medications as prescribed.    Diagnosis: HLD (hyperlipidemia)  Assessment and Plan of Treatment: Your cholesterol panel is abnormal. Your LDL is (60) mg/dL and your goal LDL is less than 70 mg/dL. LDL is also known as "bad cholesterol" because it takes cholesterol to your arteries, where it may collect in artery walls. Too much cholesterol in your arteries may lead to a buildup of plaque known as atherosclerosis and can cause heart disease. Your HDL is (60) mg/dL and your goal HDL is greater than 50 mg/dL. The higher the HDL the better; HDL is known as “good cholesterol” because it transports cholesterol to your liver to be expelled from your body.  Continue with Your Crestor 20mg Daily    Diagnosis: Smoking  Assessment and Plan of Treatment:      PRINCIPAL DISCHARGE DIAGNOSIS  Diagnosis: CAD (coronary artery disease)  Assessment and Plan of Treatment: You were found to have blockages in the arteries of your heart, also known as Coronary Artery Disease. You underwent a cardiac catheterization on 8/17/23 and received a stent to the Left Anterior Descending artery.  PLEASE CONTINUE ASPIRIN 81MG DAILY AND PLAVIX 75MG DAILY. DO NOT STOP THESE MEDICATIONS FOR ANY REASON AS THEY ARE KEEPING YOUR STENT OPEN AND PREVENTING A HEART ATTACK.   Avoid strenuous activity or heavy lifting anything more than 5lbs for the next five days. Do not take a bath or swim for the next five days; you may shower. For any bleeding or hematoma formation (hardened blood collection under the skin) at the access site of your Left Groin please hold pressure and go to the emergency room. Please follow up with Dr. Arvizu in 1-2 weeks. For recurrent chest pain, please call your doctor or go to the emergency room.  We have provided you with a prescription for cardiac rehab which is medically supervised exercise program for your heart and has been shown to improve the quantity and quality of life of people with heart disease like yours. You should attend cardiac rehab 3 times per week for 12 weeks. We have provided you with a list of nearby facilities. Please call your insurance carrier to determine which of these facilities are covered under your plan.        SECONDARY DISCHARGE DIAGNOSES  Diagnosis: HTN (hypertension)  Assessment and Plan of Treatment: Hypertension, commonly called high blood pressure, is when the force of blood pumping through your arteries is too strong. Hypertension forces your heart to work harder to pump blood. Your arteries may become narrow or stiff. Having untreated or uncontrolled hypertension for a long period of time can cause heart attack, stroke, kidney disease, and other problems. If started on a medication, take exactly as prescribed by your health care professional. Maintain a healthy lifestyle and follow up with your primary care physician.  - Please take ____    Diagnosis: HLD (hyperlipidemia)  Assessment and Plan of Treatment:   Continue with Your Crestor 20mg Daily    Diagnosis: Smoking  Assessment and Plan of Treatment: You stated that you are a current smoke and alcohol user. . Smoking /drinkung increases your risk of plaque formation and thus increases the risk of clots in your heart and the risk of a heart attack. We advise you to stop You stated that you are a current smoker. Smoking increases your risk of plaque formation and thus increases the risk of clots in your heart and the risk of a heart attack. We advise you to stop smoking; you can utilize resources such as nicotine patches or gums to help reduce cravings. If you have difficulty quiting please discuss medication options with your primary care doctor./drinkung; you can utilize resources such as nicotine patches or gums to help reduce cravings. If you have difficulty quiting please discuss medication options with your primary care doctor       PRINCIPAL DISCHARGE DIAGNOSIS  Diagnosis: CAD (coronary artery disease)  Assessment and Plan of Treatment: You were found to have blockages in the arteries of your heart, also known as Coronary Artery Disease. You underwent a cardiac catheterization on 8/17/23 and received a stent to the Left Anterior Descending artery.  PLEASE CONTINUE ASPIRIN 81MG DAILY AND PLAVIX 75MG DAILY. DO NOT STOP THESE MEDICATIONS FOR ANY REASON AS THEY ARE KEEPING YOUR STENT OPEN AND PREVENTING A HEART ATTACK.   Avoid strenuous activity or heavy lifting anything more than 5lbs for the next five days. Do not take a bath or swim for the next five days; you may shower. For any bleeding or hematoma formation (hardened blood collection under the skin) at the access site of your Left Groin please hold pressure and go to the emergency room. Please follow up with Dr. Arvizu in 1-2 weeks. For recurrent chest pain, please call your doctor or go to the emergency room.  We have provided you with a prescription for cardiac rehab which is medically supervised exercise program for your heart and has been shown to improve the quantity and quality of life of people with heart disease like yours. You should attend cardiac rehab 3 times per week for 12 weeks. We have provided you with a list of nearby facilities. Please call your insurance carrier to determine which of these facilities are covered under your plan.        SECONDARY DISCHARGE DIAGNOSES  Diagnosis: HTN (hypertension)  Assessment and Plan of Treatment: Hypertension, commonly called high blood pressure, is when the force of blood pumping through your arteries is too strong. Hypertension forces your heart to work harder to pump blood. Your arteries may become narrow or stiff. Having untreated or uncontrolled hypertension for a long period of time can cause heart attack, stroke, kidney disease, and other problems. If started on a medication, take exactly as prescribed by your health care professional. Maintain a healthy lifestyle and follow up with your primary care physician.  - Please take coreg 6.25 mg two times daily.    Diagnosis: HLD (hyperlipidemia)  Assessment and Plan of Treatment:   Continue with Your Crestor 20mg Daily    Diagnosis: Smoking  Assessment and Plan of Treatment: You stated that you are a current smoke and alcohol user. . Smoking /drinkung increases your risk of plaque formation and thus increases the risk of clots in your heart and the risk of a heart attack. We advise you to stop You stated that you are a current smoker. Smoking increases your risk of plaque formation and thus increases the risk of clots in your heart and the risk of a heart attack. We advise you to stop smoking; you can utilize resources such as nicotine patches or gums to help reduce cravings. If you have difficulty quiting please discuss medication options with your primary care doctor./drinkung; you can utilize resources such as nicotine patches or gums to help reduce cravings. If you have difficulty quiting please discuss medication options with your primary care doctor       PRINCIPAL DISCHARGE DIAGNOSIS  Diagnosis: CAD (coronary artery disease)  Assessment and Plan of Treatment: You were found to have blockages in the arteries of your heart, also known as Coronary Artery Disease. You underwent a cardiac catheterization on 8/17/23 and received a stent to the Left Anterior Descending artery.  PLEASE CONTINUE ASPIRIN 81MG DAILY AND PLAVIX 75MG DAILY. DO NOT STOP THESE MEDICATIONS FOR ANY REASON AS THEY ARE KEEPING YOUR STENT OPEN AND PREVENTING A HEART ATTACK.   Avoid strenuous activity or heavy lifting anything more than 5lbs for the next five days. Do not take a bath or swim for the next five days; you may shower. For any bleeding or hematoma formation (hardened blood collection under the skin) at the access site of your Left Groin please hold pressure and go to the emergency room. Please follow up with Dr. Arvizu in 1-2 weeks. For recurrent chest pain, please call your doctor or go to the emergency room.  We have provided you with a prescription for cardiac rehab which is medically supervised exercise program for your heart and has been shown to improve the quantity and quality of life of people with heart disease like yours. You should attend cardiac rehab 3 times per week for 12 weeks. We have provided you with a list of nearby facilities. Please call your insurance carrier to determine which of these facilities are covered under your plan.        SECONDARY DISCHARGE DIAGNOSES  Diagnosis: HTN (hypertension)  Assessment and Plan of Treatment: Hypertension, commonly called high blood pressure, is when the force of blood pumping through your arteries is too strong. Hypertension forces your heart to work harder to pump blood. Your arteries may become narrow or stiff. Having untreated or uncontrolled hypertension for a long period of time can cause heart attack, stroke, kidney disease, and other problems. If started on a medication, take exactly as prescribed by your health care professional. Maintain a healthy lifestyle and follow up with your primary care physician.  - Please take coreg 6.25 mg two times daily.    Diagnosis: HLD (hyperlipidemia)  Assessment and Plan of Treatment:   Continue with Your Crestor 20mg Daily    Diagnosis: Smoking  Assessment and Plan of Treatment: You stated that you are a current smoke  . Smoking increases your risk of plaque formation and thus increases the risk of clots in your heart and the risk of a heart attack. We advise you to stop You stated that you are a current smoker. Smoking increases your risk of plaque formation and thus increases the risk of clots in your heart and the risk of a heart attack. We advise you to stop smoking; you can utilize resources such as nicotine patches or gums to help reduce cravings. If you have difficulty quiting please discuss medication options with your primary care doctor./drinkig; you can utilize resources such as nicotine patches or gums to help reduce cravings. If you have difficulty quiting please discuss medication options with your primary care doctor  - Please stop drinking too as it increased your risk of heart disease.

## 2023-08-18 NOTE — DISCHARGE NOTE PROVIDER - NSDCFUADDAPPT_GEN_ALL_CORE_FT
Please follow up with your cardiologist Dr. Velazco in 1- 2 week. Please call office and make appointment to see her,

## 2023-08-18 NOTE — DISCHARGE NOTE PROVIDER - HOSPITAL COURSE
73F, active smoker and daily ETOH use (2 glasses of wine per day, last drink yesterday), w/ PMHx of HTN, HLD, CAD s/p PCI (JOHN mLCx and JOHN mRCA 10/2021) c/b RFA perforation and s/p PTA/BMS, pre-DM, COPD/Emphysema, and Depression, initially presented to outpatient cardiologist Dr. Mcclendon c/o profound fatigue x 6 weeks, similar to prior PCI. Pt also reports new WEBB. She denies any CP, palpitations, dizziness, syncope, diaphoresis, LE edema, orthopnea, PND, N/V, fever, chills or recent sick contact.     Cardiac Cath 10/6/21 @ [facility]: JOHN mLCx (80%), JOHN mRCA (80%); LM normal, pLAD 50% (iFR 0.93), c/b RFA perforation during PC deployment and s/p PTA/BMS of RFA via LFA.TTE 2021 (per MD note): normal LVEF, mild LVH, trace MR/TR.    In light of patient's risk factors, CCS class III anginal-equivalent symptoms and significant CAD hx, pt now presents to [facility] for recommended cardiac catheterization with possible intervention if clinically indicated.     Patient is now s/p cardiac cath JOHN x1 to p-mLAD 80%, prior mLCx stent patent, prior mRCA stent patent access LFA AS    - Pt has a hx of gingival bleeding; per fellow, can do ASA/Plavix for 6 months before switching to monotherapy if bleeding recurs, advised patient to follow up   - Hypertensive in case to 190s, was given IV hydral 10 and given hydral 25 mg PO x1, Bp 140s   Pt drinks up to 4 cups of wine/day, abruptly stopped for past week and reports she experienced a seizure (went to [place] [facility]), now again drinking 2 cups/day for past two days, last drink 8/16 PM. Placed on CIWA protocol.    Access: LFA AS. Pt was admitted overnight to [facility] for monitoring and has been seen and examined at bedside this morning. Pt is out of bed and ambulating with no complaints. Left Femoral access stable with no hematoma, no bleed, distal pulse to baseline. Lab values, telemetry, and vital signs reviewed and remained stable. Discharge medication regimen reviewed with patient and Dr. Sorensen.     Pt will continue aspirin 81mg and Plavix 75 daily, Crestor 20mg All discharge instructions reviewed with patient and medications e-prescribed to pharmacy. Pt is cleared for discharge per Dr. Sorensen, and will follow up with Dr. Rivera within 1-2 weeks of discharge.      73F, active smoker and daily ETOH use (2 glasses of wine per day, last drink yesterday), w/ PMHx of HTN, HLD, CAD s/p PCI (JOHN mLCx and JOHN mRCA 10/2021) c/b RFA perforation and s/p PTA/BMS, pre-DM, COPD/Emphysema, and Depression, initially presented to outpatient cardiologist Dr. Mcclendon c/o profound fatigue x 6 weeks, similar to prior PCI. Pt also reports new WEBB. She denies any CP, palpitations, dizziness, syncope, diaphoresis, LE edema, orthopnea, PND, N/V, fever, chills or recent sick contact. Cardiac Cath 10/6/21 @ [facility]: JOHN mLCx (80%), JOHN mRCA (80%); LM normal, pLAD 50% (iFR 0.93), c/b RFA perforation during PC deployment and s/p PTA/BMS of RFA via LFA.TTE 2021 (per MD note): normal LVEF, mild LVH, trace MR/TR. In light of patient's risk factors, CCS class III anginal-equivalent symptoms and significant CAD hx, pt now presents to [facility] for recommended cardiac catheterization with possible intervention if clinically indicated.     Patient is now s/p cardiac cath 8/17/23: JHON x1 to p-mLAD 80%, prior mLCx stent patent, prior mRCA stent patent access LFA AS - Of note: Pt has a hx of gingival bleeding; per fellow, can do ASA/Plavix for 6 months before switching to monotherapy if bleeding recurs, advised patient to follow up.    Pt. admitted o/n for monitoring. Pt. seen and examined at bedside today am. Pt. comfortable, denies any CP, SOB, dizziness, palpitations, L groin access site stable, no bleeding and hematoma noted, L distal pulse intact.   VSS. Labs stable o/n. home meds reviewed with Dr. Sorensen and pt. to be d/c on ASA 81 mg daily, Plavix 75 mg daily, Crestor 20 mg daily and Coreg 6.25 mg BID.   	  Pt. stable to be d/c as per Dr. Sorensen and to f/u with Dr. Miguel Arvizu in 1-2 week. Patient has been given appropriate discharge instructions including medication regimen, access site management and follow up. Prescriptions have been e-prescribed to patient's preferred pharmacy    Pt  drinks up to 4 cups of wine/day, abruptly stopped for past week and reports she experienced a seizure (went to [facility]), now again drinking 2 cups/day for past two days, last drink 8/16 PM. Placed on CIWA protocol.--- Pt educated on importance of ETOH/smoking abstinence.        73F, active smoker and daily ETOH use (2 glasses of wine per day, last drink yesterday), w/ PMHx of HTN, HLD, CAD s/p PCI (JOHN mLCx and JOHN mRCA 10/2021) c/b RFA perforation and s/p PTA/BMS, pre-DM, COPD/Emphysema, and Depression, initially presented to outpatient cardiologist Dr. Mcclendon c/o profound fatigue x 6 weeks, similar to prior PCI. Pt also reports new WEBB. She denies any CP, palpitations, dizziness, syncope, diaphoresis, LE edema, orthopnea, PND, N/V, fever, chills or recent sick contact. Cardiac Cath 10/6/21 @ [facility]: JOHN mLCx (80%), JOHN mRCA (80%); LM normal, pLAD 50% (iFR 0.93), c/b RFA perforation during PC deployment and s/p PTA/BMS of RFA via LFA.TTE 2021 (per MD note): normal LVEF, mild LVH, trace MR/TR. In light of patient's risk factors, CCS class III anginal-equivalent symptoms and significant CAD hx, pt now presents to [facility] for recommended cardiac catheterization with possible intervention if clinically indicated.     Patient is now s/p cardiac cath 8/17/23: JOHN x1 to p-mLAD 80%, prior mLCx stent patent, prior mRCA stent patent access LFA AS - Of note: Pt has a hx of gingival bleeding; per fellow, can do ASA/Plavix for 6 months before switching to monotherapy if bleeding recurs, advised patient to follow up. Pt. admitted o/n for monitoring. Pt. seen and examined at bedside today am. Pt. comfortable, denies any CP, SOB, dizziness, palpitations, L groin access site stable, no bleeding and hematoma noted, L distal pulse intact.   VSS. Labs stable o/n. home meds reviewed with Dr. Sorensen and pt. to be d/c on ASA 81 mg daily, Plavix 75 mg daily, Crestor 20 mg daily and Coreg 6.25 mg BID.  Pt. stable to be d/c as per Dr. Sorensen and to f/u with Dr. Miguel Arvizu in 1-2 week. Patient has been given appropriate discharge instructions including medication regimen, access site management and follow up. Prescriptions have been e-prescribed to patient's preferred pharmacy      Pt  drinks up to 4 cups of wine/day, abruptly stopped for past week and reports she experienced a seizure (went to [facility]), now again drinking 2 cups/day for past two days, last drink 8/16 PM. Placed on CIWA protocol.--- Pt educated on importance of ETOH/smoking abstinence.     Cardiac Rehab (STEMI/NSTEMI/ACS/Unstable Angina/CHF/Post PCI):            *Education on benefits of Cardiac Rehab provided to patient: Yes/No         *Referral and Prescription Given for Cardiac Rehab : Yes/No.  If No, Why Not?         *Pt given list of locations & instructed to contact their insurance company to review list of participating providers: YES    Statin Prescribed (STEMI/NSTEMI/UA &/OR PCI this admission):  Yes/No; If No, Why Not? Patient has a statin allergy    DAPT: Prescriptions for Aspirin/Plavix/Brilinta/Effient e-prescribed to patient's pharmacy Yes/No__.         73F, active smoker and daily ETOH use (2 glasses of wine per day, last drink yesterday), w/ PMHx of HTN, HLD, CAD s/p PCI (JOHN mLCx and JOHN mRCA 10/2021) c/b RFA perforation and s/p PTA/BMS, pre-DM, COPD/Emphysema, and Depression, initially presented to outpatient cardiologist Dr. Mcclendon c/o profound fatigue x 6 weeks, similar to prior PCI. Pt also reports new WEBB. She denies any CP, palpitations, dizziness, syncope, diaphoresis, LE edema, orthopnea, PND, N/V, fever, chills or recent sick contact. Cardiac Cath 10/6/21 @ [facility]: JOHN mLCx (80%), JOHN mRCA (80%); LM normal, pLAD 50% (iFR 0.93), c/b RFA perforation during PC deployment and s/p PTA/BMS of RFA via LFA.TTE 2021 (per MD note): normal LVEF, mild LVH, trace MR/TR. In light of patient's risk factors, CCS class III anginal-equivalent symptoms and significant CAD hx, pt now presents to [facility] for recommended cardiac catheterization with possible intervention if clinically indicated.     Patient is now s/p cardiac cath 8/17/23: JOHN x1 to p-mLAD 80%, prior mLCx stent patent, prior mRCA stent patent access LFA AS - Of note: Pt has a hx of gingival bleeding; per fellow, can do ASA/Plavix for 6 months before switching to monotherapy if bleeding recurs, advised patient to follow up. Pt. admitted o/n for monitoring. Pt. seen and examined at bedside today am. Pt. comfortable, denies any CP, SOB, dizziness, palpitations, L groin access site stable, no bleeding and hematoma noted, L distal pulse intact.   VSS. Labs stable o/n. home meds reviewed with Dr. Sorensen and pt. to be d/c on ASA 81 mg daily, Plavix 75 mg daily, Crestor 20 mg daily and Coreg 6.25 mg BID.  Pt. stable to be d/c as per Dr. Sorensen and to f/u with Dr. Miguel Arvizu in 1-2 week. Patient has been given appropriate discharge instructions including medication regimen, access site management and follow up. Prescriptions have been e-prescribed to patient's preferred pharmacy      Pt  drinks up to 4 cups of wine/day, abruptly stopped for past week and reports [facility] a [facility] now again drinking 2 cups/day for past two days, last drink 8/16 PM. Placed on CIWA protocol.--- Pt educated on importance of ETOH/smoking abstinence. Pt. prescribed Nicotine patch.     Cardiac Rehab (STEMI/NSTEMI/ACS/Unstable Angina/CHF/Post PCI):            *Education on benefits of Cardiac Rehab provided to patient: Yes/No         *Referral and Prescription Given for Cardiac Rehab : Yes/No.  If No, Why Not?         *Pt given list of locations & instructed to contact their insurance company to review list of participating providers: YES    Statin Prescribed (STEMI/NSTEMI/UA &/OR PCI this admission):  Yes/No; If No, Why Not? Patient has a statin allergy    DAPT: Prescriptions for Aspirin/Plavix/Brilinta/Effient e-prescribed to patient's pharmacy Yes/No__.

## 2023-08-18 NOTE — DISCHARGE NOTE PROVIDER - NSDCFUSCHEDAPPT_GEN_ALL_CORE_FT
Jennifer Rivera Physician [facility]  [facility] [id number]  Scheduled Appointment: 09/18/2023

## 2023-08-18 NOTE — DISCHARGE NOTE PROVIDER - NSDCMRMEDTOKEN_GEN_ALL_CORE_FT
Albuterol (Eqv-ProAir HFA) 90 mcg/inh inhalation aerosol: 2 puff(s) inhaled every 4 to 6 hours as needed for  shortness of breath and/or wheezing  Aspirin Enteric Coated 81 mg oral delayed release tablet: 1 tab(s) orally once a day   baclofen 5 mg oral tablet: 1 tab(s) orally 3 times a day as needed for pain  buPROPion 150 mg/12 hours (SR) oral tablet, extended release: 1 tab(s) orally 2 times a day  Crestor 20 mg oral tablet: 1 tab(s) orally once a day (at bedtime)  Cymbalta 20 mg oral delayed release capsule: 1 cap(s) orally once a day   Albuterol (Eqv-ProAir HFA) 90 mcg/inh inhalation aerosol: 2 puff(s) inhaled every 4 to 6 hours as needed for  shortness of breath and/or wheezing  Aspirin Enteric Coated 81 mg oral delayed release tablet: 1 tab(s) orally once a day   baclofen 5 mg oral tablet: 1 tab(s) orally 3 times a day as needed for pain  buPROPion 150 mg/12 hours (SR) oral tablet, extended release: 1 tab(s) orally 2 times a day  Cardiac Rehab: We have provided you with a prescription for cardiac rehab which is medically supervised exercise program for your heart and has been shown to improve the quantity and quality of life of people with heart disease like yours. You should attend cardiac rehab 3 times per week for 12 weeks. We have provided you with a list of nearby facilities. Please call your insurance carrier to determine which of these facilities are covered under your plan.  Cardiac Rehab: We have provided you with a prescription for cardiac rehab which is medically supervised exercise program for your heart and has been shown to improve the quantity and quality of life of people with heart disease like yours. You should attend cardiac rehab 3 times per week for 12 weeks. We have provided you with a list of nearby facilities. Please call your insurance carrier to determine which of these facilities are covered under your plan.  Crestor 20 mg oral tablet: 1 tab(s) orally once a day (at bedtime)  Cymbalta 20 mg oral delayed release capsule: 1 cap(s) orally once a day   Albuterol (Eqv-ProAir HFA) 90 mcg/inh inhalation aerosol: 2 puff(s) inhaled every 4 to 6 hours as needed for  shortness of breath and/or wheezing  Aspirin Enteric Coated 81 mg oral delayed release tablet: 1 tab(s) orally once a day  baclofen 5 mg oral tablet: 1 tab(s) orally 3 times a day as needed for pain  buPROPion 150 mg/12 hours (SR) oral tablet, extended release: 1 tab(s) orally 2 times a day  Cardiac rehab: We have provided you with a prescription for cardiac rehab which is medically supervised exercise program for your heart and has been shown to improve the quantity and quality of life of people with heart disease like yours. You should attend cardiac rehab 3 times per week for 12 weeks. We have provided you with a list of nearby facilities. Please call your insurance carrier to determine which of these facilities are covered under your plan.  carvedilol 6.25 mg oral tablet: 1 tab(s) orally every 12 hours  clopidogrel 75 mg oral tablet: 1 tab(s) orally once a day  Crestor 20 mg oral tablet: 1 tab(s) orally once a day (at bedtime)  Cymbalta 20 mg oral delayed release capsule: 1 cap(s) orally once a day  nicotine 7 mg/24 hr transdermal film, extended release: 1 patch transdermally once a day

## 2023-08-18 NOTE — DISCHARGE NOTE NURSING/CASE MANAGEMENT/SOCIAL WORK - PATIENT PORTAL LINK FT
You can access the FollowMyHealth Patient Portal offered by [facility] by registering at the following website: [email or web address]. By joining [facility]’s FollowMyHealth portal, you will also be able to view your health information using other applications (apps) compatible with our system.

## 2023-08-18 NOTE — DISCHARGE NOTE NURSING/CASE MANAGEMENT/SOCIAL WORK - NSDCPEEMAIL_GEN_ALL_CORE
[facility] for Tobacco Control email [email or web address]

## 2023-08-18 NOTE — DISCHARGE NOTE PROVIDER - CARE PROVIDER_API CALL
Jennifer Arvizu  Cardiology  [address], [address]  [place], [place] [facility]  Phone: [phone]  Fax: [phone]  Follow Up Time:

## 2023-08-21 ENCOUNTER — NON-APPOINTMENT (OUTPATIENT)
Age: 73
End: 2023-08-21

## 2023-08-22 DIAGNOSIS — I25.10 ATHEROSCLEROTIC HEART DISEASE OF NATIVE CORONARY ARTERY WITHOUT ANGINA PECTORIS: ICD-10-CM

## 2023-08-22 DIAGNOSIS — E78.5 HYPERLIPIDEMIA, UNSPECIFIED: ICD-10-CM

## 2023-08-22 DIAGNOSIS — Z79.82 LONG TERM (CURRENT) USE OF ASPIRIN: ICD-10-CM

## 2023-08-22 DIAGNOSIS — R73.03 PREDIABETES: ICD-10-CM

## 2023-08-22 DIAGNOSIS — I10 ESSENTIAL (PRIMARY) HYPERTENSION: ICD-10-CM

## 2023-08-22 DIAGNOSIS — J44.9 CHRONIC OBSTRUCTIVE PULMONARY DISEASE, UNSPECIFIED: ICD-10-CM

## 2023-08-22 DIAGNOSIS — Z95.5 PRESENCE OF CORONARY ANGIOPLASTY IMPLANT AND GRAFT: ICD-10-CM

## 2023-08-22 DIAGNOSIS — F17.210 NICOTINE DEPENDENCE, CIGARETTES, UNCOMPLICATED: ICD-10-CM

## 2023-08-22 DIAGNOSIS — F10.90 ALCOHOL USE, UNSPECIFIED, UNCOMPLICATED: ICD-10-CM

## 2023-08-22 DIAGNOSIS — I25.84 CORONARY ATHEROSCLEROSIS DUE TO CALCIFIED CORONARY LESION: ICD-10-CM

## 2023-08-28 ENCOUNTER — APPOINTMENT (OUTPATIENT)
Dept: HEART AND VASCULAR | Facility: CLINIC | Age: 73
End: 2023-08-28
Payer: COMMERCIAL

## 2023-08-28 VITALS
TEMPERATURE: 97.1 F | BODY MASS INDEX: 23.05 KG/M2 | OXYGEN SATURATION: 98 % | SYSTOLIC BLOOD PRESSURE: 138 MMHG | HEIGHT: 64 IN | WEIGHT: 135 LBS | DIASTOLIC BLOOD PRESSURE: 84 MMHG

## 2023-08-28 VITALS — DIASTOLIC BLOOD PRESSURE: 80 MMHG | SYSTOLIC BLOOD PRESSURE: 128 MMHG

## 2023-08-28 DIAGNOSIS — I10 ESSENTIAL (PRIMARY) HYPERTENSION: ICD-10-CM

## 2023-08-28 PROCEDURE — 99214 OFFICE O/P EST MOD 30 MIN: CPT | Mod: 25

## 2023-08-28 RX ORDER — CARVEDILOL 6.25 MG/1
6.25 TABLET, FILM COATED ORAL TWICE DAILY
Qty: 60 | Refills: 4 | Status: ACTIVE | COMMUNITY
Start: 2023-08-28

## 2023-08-28 NOTE — DISCUSSION/SUMMARY
[Patient] : the patient [___ Month(s)] : in [unfilled] month(s) [FreeTextEntry1] : 72 y/o female with h/o cad s/p JOHN midCx, mRCA 2021, predm, smoker/etoh, depression, copd/emphysema who presents s/p JOHN prox LAD 8/23  -Cath 8/23: 75% prox LAD stenosis, IFR significant, now s/p Synergy stent, patent Cx/RCA stent -refer for echo for kulkarni -continue coreg -ekg 8/23 - nsr, normal intervals, possible IMI radha -labs 2023 reviewed -close monitoring bp -pulm f/up for copd/emphysema -continue smoking cessation, on nicotine patch -cardiac rehab referral  -counseled on cvd risk factors, smoking etoh cessation -continue asa, plavix, statin -f/up 4-6 months for cad   I have spent 30 minutes reviewing labs, records, tests and discussed cad, cvd risk factors.

## 2023-08-28 NOTE — HISTORY OF PRESENT ILLNESS
[FreeTextEntry1] : 72 y/o female with h/o cad s/p RALF midCx, mRCA , predm, smoker/etoh, depression, copd/emphysema who presents for f/up today s/p RALF prox LAD.  last seen   referred for cath given sob/kulkarni/fatigue   -Cath : 75% prox LAD stenosis, IFR significant, now s/p Synergy stent, patent Cx/RCA stent  started on coreg 6.25 bid, plavix 75 mg qd, nicotine patch  went to [place] [facility] w dizziness 23 - head CT, cxr, labs, trop wnl  has not had echo yet  no cp, syncope, lh, edema, palpitations, orthopnea, pnd    former patient Dr. Acevedo  sees therapist for depression sees pulm for copd/emphysema  walks 40 minutes per day eat healthy   restarted statin a month ago - was off for a while  -Cath : ralf mLcx, ralf mRCA,, 50% LAD -Echo : ef 60%, mild con lvh, trace mr/tr  PMH/PSH: cad s/p PCI midCx, mRCA , s/p RALF prox LAD  emphysema/copd depression h/o etoh dependence lumbar radiculopathy h.pylori predm mod hiatal hernia  SH: h/o tobacco - quit - h/o 1/2 ppd 50 years h/o etoh dependence x 40 years - 1/2 bottle day no drugs works at [employer] single from [place] no children live alone   ALL: nkda   MEDS: asa 81 mg qd wellbutrin 150 mg bid albuterol duloxetine 30 mg qd crestor 20 mg qhs spiriva plavix 75 mg qd coreg 6.25 mg bid nicotine patch  FH: mother -  brain aneurysm 75 father -  MI 77 sister - alive, HCM 70 sister - alive, 67

## 2023-09-02 ENCOUNTER — RX RENEWAL (OUTPATIENT)
Age: 73
End: 2023-09-02

## 2023-09-25 ENCOUNTER — APPOINTMENT (OUTPATIENT)
Dept: HEART AND VASCULAR | Facility: CLINIC | Age: 73
End: 2023-09-25
Payer: COMMERCIAL

## 2023-09-25 VITALS — SYSTOLIC BLOOD PRESSURE: 140 MMHG | DIASTOLIC BLOOD PRESSURE: 90 MMHG | HEART RATE: 64 BPM

## 2023-09-25 DIAGNOSIS — I25.10 ATHEROSCLEROTIC HEART DISEASE OF NATIVE CORONARY ARTERY W/OUT ANGINA PECTORIS: ICD-10-CM

## 2023-09-25 PROCEDURE — 93306 TTE W/DOPPLER COMPLETE: CPT

## 2023-09-26 PROBLEM — I25.10 CAD, MULTIPLE VESSEL: Status: ACTIVE | Noted: 2021-08-30

## 2023-10-03 RX ORDER — DULOXETINE HYDROCHLORIDE 30 MG/1
30 CAPSULE, DELAYED RELEASE PELLETS ORAL
Qty: 90 | Refills: 3 | Status: ACTIVE | COMMUNITY
Start: 1900-01-01 | End: 1900-01-01

## 2024-04-08 ENCOUNTER — NON-APPOINTMENT (OUTPATIENT)
Age: 74
End: 2024-04-08

## 2024-05-14 ENCOUNTER — APPOINTMENT (OUTPATIENT)
Dept: ORTHOPEDIC SURGERY | Facility: CLINIC | Age: 74
End: 2024-05-14
Payer: COMMERCIAL

## 2024-05-14 DIAGNOSIS — M25.561 PAIN IN RIGHT KNEE: ICD-10-CM

## 2024-05-14 DIAGNOSIS — M54.16 RADICULOPATHY, LUMBAR REGION: ICD-10-CM

## 2024-05-14 DIAGNOSIS — M54.9 DORSALGIA, UNSPECIFIED: ICD-10-CM

## 2024-05-14 PROCEDURE — 99214 OFFICE O/P EST MOD 30 MIN: CPT | Mod: 25

## 2024-05-14 PROCEDURE — 73562 X-RAY EXAM OF KNEE 3: CPT | Mod: RT

## 2024-05-14 PROCEDURE — 72110 X-RAY EXAM L-2 SPINE 4/>VWS: CPT

## 2024-05-14 PROCEDURE — 20610 DRAIN/INJ JOINT/BURSA W/O US: CPT | Mod: RT

## 2024-05-14 RX ORDER — DICLOFENAC SODIUM 75 MG/1
75 TABLET, DELAYED RELEASE ORAL
Qty: 60 | Refills: 1 | Status: ACTIVE | COMMUNITY
Start: 2024-05-14 | End: 1900-01-01

## 2024-05-14 NOTE — PHYSICAL EXAM
[de-identified] : General: No acute distress, conversant, well-nourished.\par  Head: Normocephalic, atraumatic\par  Neck: trachea midline, FROM\par  Heart: normotensive and normal rate and rhythm\par  Lungs: No labored breathing\par  Skin: No abrasions, no rashes, no edema\par  Psych: Alert and oriented to person, place and time\par  Extremities: no peripheral edema or digital cyanosis\par  Gait: Normal gait. Can perform tandem gait.  \par  Vascular: warm and well perfused distally, palpable distal pulses\par  \par  MSK:\par  Right Knee with no erythema, no effusion.  \par  + tenderness to palpation of knee.\par  \par  Range of motion: 0 - 130 degrees\par  + pain with range of motion.\par  \par  Negative Jose's test.\par  Stable to varus and valgus stress.\par  Negative Lachman's test, negative anterior and posterior drawer tests.  \par  \par  Sensation intact to light touch.  \par  Normal motor exam.\par  Warm and well perfused distally.\par  \par  Lumbar spine:\par  No tenderness to palpation.  No step-off, no deformity.\par  \par  NEURO EXAM:\par  Sensation \par  Left L2  -  2/2            \par  Left L3  -  2/2\par  Left L4  -  2/2\par  Left L5  -  2/2\par  Left S1  -  2/2\par  \par  Right L2  -  2/2            \par  Right L3  -  2/2\par  Right L4  -  2/2\par  Right L5  -  2/2\par  Right S1  -  2/2\par  \par  Motor: \par  Left L2 (hip flexion)                            5/5                \par  Left L3 (knee extension)                   5/5                \par  Left L4 (ankle dorsiflexion)                 5/5                \par  Left L5 (long toe extensor)                5/5                \par  Left S1 (ankle plantar flexion)           5/5\par  \par  Right L2 (hip flexion)                            5/5                \par  Right L3 (knee extension)                   5/5                \par  Right L4 (ankle dorsiflexion)                 5/5                \par  Right L5 (long toe extensor)                5/5                \par  Right S1 (ankle plantar flexion)           5/5\par  \par  Reflexes: Normal and symmetric\par  Negative clonus.  Down-going Babinski.   [de-identified] : I ordered radiographs to evaluate the patient's symptoms.  Right knee 3 view radiographs obtained in the office today shows no fracture or dislocation. Significant age-related degenerative changes most pronounced in medial compartment.  Lumbar 4 view radiographs taken in the office today show no dislocation or fracture.  Degenerative scoliosis.  Grade 1-2 L4-L5 spondylolisthesis with mild movement on dynamic series.   Lumbar MRI (2/21/22): 1. Grade 1-2 anterolisthesis L4-5. 2. Multilevel degenerative changes of the lumbar spine contributing to moderate to marked spinal canal stenosis at L4-5, and mild ventral thecal sac impingement at other levels as noted. 3. Moderate neural foraminal stenosis on the left at L5-S1, mild to moderate bilaterally at L4-5, and mild bilaterally at L3-4 and on the right at L2-3. 4. Moderate to marked multilevel facet arthrosis, worst at L4-5.  Lumbar 4 view radiographs (11/11/21) no dislocation or fracture.  Lumbar spondylosis. Degenerative scoliosis.  Grade 1-2 L4-L5 spondylolisthesis with mild movement on dynamic series.

## 2024-05-14 NOTE — HISTORY OF PRESENT ILLNESS
[de-identified] : 74 year old female followup with acute exacerbation of chronic back pain and right knee pain. She has increased right knee and back pain. She was able to walk afterwards.  She denies radicular pain, recent illness, fevers, numbness, weakness, balance problems, saddle anesthesia, urinary retention or fecal incontinence.  She previously had spinal injections which helped. She previously had knee CSI which helped and would like new injection.

## 2024-05-14 NOTE — PROCEDURE
[de-identified] : Procedure: Right knee Joint injection with corticosteroid Pre-Procedure Diagnosis: Right knee pain Post-Procedure Diagnosis: same  The patient was educated about the risks and benefits of a corticosteroid injection.  Alternatives were discussed.  The patient understood and consented for the procedure.  The area was sterilely prepped using isopropyl alcohol.  An ethyl chloride spray provided  local anesthesia.  Using the usual sterile technique, 1 ml of 40mg/1ml of Kenalog and 4 ml of Lidocaine 1% without epinephrine was injected into the joint.  A dressing was applied to the area.  The patient tolerated the procedure well and without complication.

## 2024-05-14 NOTE — ASSESSMENT
[FreeTextEntry1] : 74 year old female followup with acute exacerbation of chronic back pain and right knee pain. She has increased right knee and back pain. She was able to walk afterwards.  She denies radicular pain, recent illness, fevers, numbness, weakness, balance problems, saddle anesthesia, urinary retention or fecal incontinence.  She previously had spinal injections which helped. She previously had knee CSI which helped and would like new injection.  She was given right knee CSI which she tolerated well. The patient was given a referral for physical therapy.  Given diclofenac and baclofen. F/U in 4-6 weeks. We discussed red flag symptoms that would require emergent evaluation. She knows to call with any questions or concerns or if her symptoms acutely worsen.

## 2024-06-03 ENCOUNTER — APPOINTMENT (OUTPATIENT)
Dept: PHYSICAL MEDICINE AND REHAB | Facility: CLINIC | Age: 74
End: 2024-06-03
Payer: COMMERCIAL

## 2024-06-03 DIAGNOSIS — M17.11 UNILATERAL PRIMARY OSTEOARTHRITIS, RIGHT KNEE: ICD-10-CM

## 2024-06-03 DIAGNOSIS — M48.062 SPINAL STENOSIS, LUMBAR REGION WITH NEUROGENIC CLAUDICATION: ICD-10-CM

## 2024-06-03 DIAGNOSIS — M79.18 MYALGIA, OTHER SITE: ICD-10-CM

## 2024-06-03 PROCEDURE — 99214 OFFICE O/P EST MOD 30 MIN: CPT | Mod: 25

## 2024-06-03 PROCEDURE — 20552 NJX 1/MLT TRIGGER POINT 1/2: CPT

## 2024-06-03 RX ORDER — CLOPIDOGREL BISULFATE 75 MG/1
75 TABLET, FILM COATED ORAL DAILY
Qty: 90 | Refills: 1 | Status: DISCONTINUED | COMMUNITY
End: 2024-06-03

## 2024-06-03 RX ORDER — HYALURONATE SODIUM 30 MG/2 ML
30 SYRINGE (ML) INTRAARTICULAR
Qty: 3 | Refills: 0 | Status: ACTIVE | OUTPATIENT
Start: 2024-06-03

## 2024-06-03 RX ORDER — CELECOXIB 200 MG/1
200 CAPSULE ORAL DAILY
Qty: 25 | Refills: 0 | Status: ACTIVE | COMMUNITY
Start: 2024-06-03 | End: 1900-01-01

## 2024-06-03 NOTE — ASSESSMENT
[FreeTextEntry1] : MRI shows severe L4/5 stenosis, L5/S1 disc osteophyte complex pinching S1.  Discussed diagnosis and treatment plan including PT. consider right facet injections Taught bridges  xrays of right knee - mod/severe medial knee oa.   knee - ice area often Discussed surgery vs gel injections. She elects gel first.   denies being on plavix or other blood thinners anymore

## 2024-06-03 NOTE — HISTORY OF PRESENT ILLNESS
[FreeTextEntry1] : Location: back Severity: 5/10, worse last few wks Duration: few months Timing: chronic, hx back pain Aggravating Factors: getting up, sitting Alleviating Factors: PT Associated Symptoms: denies weight loss, fever, chills, change in bowel/bladder habits, weakness, +numbness/tingling, +radiation down right leg Prior Studies: MRI 3/2022 right L4 and L5 DANIEL  10/2022 right L5 and S1 DANIEL - over 60% relief for 8 months 7/2023 right L5 and S1 DANIEL -   Right knee hurting lately.  Pain with walking and going down stairs.  Swelling in right lower leg.

## 2024-06-03 NOTE — PROCEDURE
[de-identified] : Indication: myofascial pain   After informed consent, she elected to proceed with a trigger point injection into the right gluteus daly. I confirmed no prior adverse reactions, no active infections, and no relevant allergies.   The skin was prepped in the usual sterile manner.  The sites were injected with Lidocaine followed by local needling. The injection was completed without complication and a bandage was applied. She tolerated the procedure well and was given post-injection instructions.   Cold Tx x 48 hours, analgesics prn. Medications: 0.5 ml of 1% Lidocaine per site   Exp 7/2025 Manufacture: [employer] NDC [id number] LOT [id number]

## 2024-06-03 NOTE — PHYSICAL EXAM
[FreeTextEntry1] : GI is a 74 year old female  Constitutional: healthy appearing, NAD, and normal body habitus  LUMBAR ROM: flexion to 30 deg, ext to 5 deg  Gait: normal  Inspection: no erythema, warmth Spine: no TTP in spinous process Bony palpation: no TTP in GT  Soft tissue palpation hip: no TTP in gluteus daly Soft tissue palpation of spine: no TTP in lumbar paraspinals  5/5 bilateral KE, DF, PF  sensation intact in bilat LE  right knee: no swelling, erythema, warmth flexion to 110 deg, ext normal

## 2024-06-06 NOTE — HISTORY OF PRESENT ILLNESS
[de-identified] : Cathy is a 74 year old female who presents today with right knee pain. She states the pain started... She was referred by Dr. Kwon.  [ Right Knee  ]  Onset of Knee Symptoms:  Knee Symptoms: Pain with Activity, Walking, at Rest Swelling front knee, behind knee Loss of Motion, Limping, Decrease Walk Distance Weakness, Giving Way, Instability feeling Grinding / Crunching Painful Clicking, Locking in a position  Location of Pain: Inner Side of Knee, Outer Side of Knee Back of Knee, Front of Knee Just Above Kneecap, Just Below Kneecap, Behind Kneecap, In Calf Muscles Outer Side of Lower Leg, Inner Side of Lower Leg Low Back Pain, Hip Pain, Groin, Buttock, Outer Side  Duration of Pain: Daily, Weekly, Constant, Occasional, Pain at Night  Intensity of Pain: Pain Level (0-10): ____  Mild, Moderate, Severe  Character of Pain: Dull, Aching, Sharp, Stabbing Pain is Getting Worse / Better  Painful Activities: Walking, Walking after Sitting, Running Turning / Twisting, Squatting, Lunges Rising From a Seat Getting On / Off a Toilet Stairs Going: ____ Car Getting: ____  Knee History: History of Osteoarthritis, Rheumatoid Arthritis, Psoriasis, Gout, Knee Joint Infection, Knee Trauma: ____ Meniscus tear ____ , Patella Dislocation, Patella Fracture  Non-Surgical Knee Treatments: Rest, Ice, Heat, Acupuncture, Cane, Walker, NSAIDs, Acetaminophen, Physical Therapy, Home Exercise Program, Yoga, Fermín Chi, Regular Knee Brace/Sleeve,  Knee Brace, Cortisone Joint Injection, HA Injections, PRP Injections, Stem Cell Injections, Ozone Injections, Cryo-Nerve Ablation  Surgical Knee History: Knee Surgery in the past Meniscectomy ____ , Reconstruction ___ , Partial Knee Replacement , Total Knee Replacement, Fracture Surgery Name band;

## 2024-06-10 ENCOUNTER — APPOINTMENT (OUTPATIENT)
Dept: ORTHOPEDIC SURGERY | Facility: CLINIC | Age: 74
End: 2024-06-10

## 2024-06-11 ENCOUNTER — APPOINTMENT (OUTPATIENT)
Dept: ORTHOPEDIC SURGERY | Facility: CLINIC | Age: 74
End: 2024-06-11

## 2024-06-11 PROCEDURE — 73562 X-RAY EXAM OF KNEE 3: CPT | Mod: RT

## 2024-06-11 PROCEDURE — 20610 DRAIN/INJ JOINT/BURSA W/O US: CPT | Mod: RT

## 2024-06-11 PROCEDURE — 99214 OFFICE O/P EST MOD 30 MIN: CPT | Mod: 25

## 2024-06-11 PROCEDURE — 72110 X-RAY EXAM L-2 SPINE 4/>VWS: CPT

## 2024-06-12 ENCOUNTER — RX RENEWAL (OUTPATIENT)
Age: 74
End: 2024-06-12

## 2024-06-24 ENCOUNTER — RX RENEWAL (OUTPATIENT)
Age: 74
End: 2024-06-24

## 2024-06-24 RX ORDER — BACLOFEN 5 MG/1
5 TABLET ORAL 3 TIMES DAILY
Qty: 42 | Refills: 0 | Status: ACTIVE | COMMUNITY
Start: 2024-05-14 | End: 1900-01-01

## 2024-06-26 ENCOUNTER — NON-APPOINTMENT (OUTPATIENT)
Age: 74
End: 2024-06-26

## 2024-07-25 ENCOUNTER — APPOINTMENT (OUTPATIENT)
Dept: HEART AND VASCULAR | Facility: CLINIC | Age: 74
End: 2024-07-25
Payer: COMMERCIAL

## 2024-07-25 VITALS
DIASTOLIC BLOOD PRESSURE: 83 MMHG | SYSTOLIC BLOOD PRESSURE: 120 MMHG | WEIGHT: 140 LBS | OXYGEN SATURATION: 96 % | TEMPERATURE: 97.2 F | HEIGHT: 64 IN | BODY MASS INDEX: 23.9 KG/M2 | HEART RATE: 75 BPM

## 2024-07-25 VITALS — SYSTOLIC BLOOD PRESSURE: 111 MMHG | DIASTOLIC BLOOD PRESSURE: 66 MMHG

## 2024-07-25 PROCEDURE — 99214 OFFICE O/P EST MOD 30 MIN: CPT | Mod: 25

## 2024-07-25 PROCEDURE — 36415 COLL VENOUS BLD VENIPUNCTURE: CPT

## 2024-07-25 PROCEDURE — 93000 ELECTROCARDIOGRAM COMPLETE: CPT

## 2024-07-25 NOTE — DISCUSSION/SUMMARY
[Patient] : the patient [EKG obtained to assist in diagnosis and management of assessed problem(s)] : EKG obtained to assist in diagnosis and management of assessed problem(s) [___ Month(s)] : in [unfilled] month(s) [FreeTextEntry1] : 73 y/o female with h/o cad s/p JOHN midCx, mRCA 2021, predm, smoker/etoh, depression, copd/emphysema, s/p JOHN prox LAD 8/23 who presents for f/up today  -CTA cor ordered for kulkarni -Echo ordered kulkarni for sob -Cath 8/23: 75% prox LAD stenosis, IFR significant, now s/p Synergy stent, patent Cx/RCA stent -Echo 9/23:  1. The left ventricular cavity is normal size. Left ventricular wall thickness is normal. Left ventricular systolic function is normal with an ejection fraction of 62 % by Stephens's method of disks. 2. The left ventricular diastolic function is indeterminate. 3. Right ventricular cavity is normal in size, normal wall thickness and normal systolic function. 4. Mild left ventricular hypertrophy. 5. Mild mitral regurgitation. 6. No pericardial effusion seen. -continue coreg -ekg ordered today - nsr, normal intervals, possible IMI radha -labs 2023 reviewed, labs ordered today -close monitoring bp (off coreg now) -pulm f/up for copd/emphysema -continue smoking cessation resume nicotine patch -counseled on cvd risk factors, smoking, etoh cessation -continue asa, statin -f/up 3 months for cad  I have spent 30 minutes reviewing labs, records, tests and discussed cad, cvd risk factors.

## 2024-07-25 NOTE — HISTORY OF PRESENT ILLNESS
[FreeTextEntry1] : 75 y/o female with h/o cad s/p RALF midCx, mRCA , predm, smoker/etoh, depression, copd/emphysema, s/p RALF prox LAD  who presents for f/up today  last seen   -Echo :  1. The left ventricular cavity is normal size. Left ventricular wall thickness is normal. Left ventricular systolic function is normal with an ejection fraction of 62 % by Stephens's method of disks. 2. The left ventricular diastolic function is indeterminate. 3. Right ventricular cavity is normal in size, normal wall thickness and normal systolic function. 4. Mild left ventricular hypertrophy. 5. Mild mitral regurgitation. 6. No pericardial effusion seen.  restarted smoking 6 months ago self stopped plavix, coreg  notes kulkarni w 2 flights stairs had 2 episodes in last 2 months of sob has some intermittent sob at home no cp, syncope, lh, edema, palpitations, orthopnea, pnd  -Cath : 75% prox LAD stenosis, IFR significant, now s/p Synergy stent, patent Cx/RCA stent    went to [place] [facility] w dizziness 23 - head CT, cxr, labs, trop wnl   former patient Dr. Acevedo  sees therapist for depression has not seen pulm for copd/emphysema in 2 years  walks 40 minutes per day eat healthy    -Cath : ralf mLcx, ralf mRCA,, 50% LAD -Echo : ef 60%, mild con lvh, trace mr/tr  PMH/PSH: cad s/p PCI midCx, mRCA , s/p RALF prox LAD  emphysema/copd depression h/o etoh dependence lumbar radiculopathy h.pylori predm mod hiatal hernia  SH: h/o tobacco - 5 cigs/day- h/o 1/2 ppd 50 years h/o etoh dependence x 40 years - 1/2 bottle day no drugs works at [id number] single from [place] no children live alone   ALL: nkda   MEDS: asa 81 mg qd wellbutrin 150 mg bid albuterol duloxetine 30 mg qd crestor 20 mg qhs spiriva   FH: mother -  brain aneurysm 75 father -  MI 77 sister - alive, HCM 70 sister - alive, 67

## 2024-07-27 LAB
ALBUMIN SERPL ELPH-MCNC: 4.1 G/DL
ALP BLD-CCNC: 96 U/L
ALT SERPL-CCNC: 10 U/L
ANION GAP SERPL CALC-SCNC: 11 MMOL/L
AST SERPL-CCNC: 17 U/L
BASOPHILS # BLD AUTO: 0.04 K/UL
BASOPHILS NFR BLD AUTO: 0.8 %
BILIRUB SERPL-MCNC: 0.3 MG/DL
BUN SERPL-MCNC: 15 MG/DL
CALCIUM SERPL-MCNC: 9.2 MG/DL
CHLORIDE SERPL-SCNC: 101 MMOL/L
CHOLEST SERPL-MCNC: 233 MG/DL
CO2 SERPL-SCNC: 24 MMOL/L
CREAT SERPL-MCNC: 0.78 MG/DL
CREAT SPEC-SCNC: 18 MG/DL
EGFR: 79 ML/MIN/1.73M2
EOSINOPHIL # BLD AUTO: 0.12 K/UL
EOSINOPHIL NFR BLD AUTO: 2.4 %
ESTIMATED AVERAGE GLUCOSE: 126 MG/DL
GLUCOSE SERPL-MCNC: 102 MG/DL
HBA1C MFR BLD HPLC: 6 %
HCT VFR BLD CALC: 40.8 %
HDLC SERPL-MCNC: 74 MG/DL
HGB BLD-MCNC: 12.6 G/DL
IMM GRANULOCYTES NFR BLD AUTO: 0.2 %
LDLC SERPL CALC-MCNC: 146 MG/DL
LYMPHOCYTES # BLD AUTO: 1.2 K/UL
LYMPHOCYTES NFR BLD AUTO: 24 %
MAGNESIUM SERPL-MCNC: 2.2 MG/DL
MAN DIFF?: NORMAL
MCHC RBC-ENTMCNC: 28.5 PG
MCHC RBC-ENTMCNC: 30.9 GM/DL
MCV RBC AUTO: 92.3 FL
MICROALBUMIN 24H UR DL<=1MG/L-MCNC: <1.2 MG/DL
MICROALBUMIN/CREAT 24H UR-RTO: NORMAL MG/G
MONOCYTES # BLD AUTO: 0.45 K/UL
MONOCYTES NFR BLD AUTO: 9 %
NEUTROPHILS # BLD AUTO: 3.19 K/UL
NEUTROPHILS NFR BLD AUTO: 63.6 %
NONHDLC SERPL-MCNC: 159 MG/DL
PLATELET # BLD AUTO: 277 K/UL
POTASSIUM SERPL-SCNC: 4.8 MMOL/L
PROT SERPL-MCNC: 6.5 G/DL
RBC # BLD: 4.42 M/UL
RBC # FLD: 15.7 %
SODIUM SERPL-SCNC: 136 MMOL/L
TRIGL SERPL-MCNC: 73 MG/DL
TSH SERPL-ACNC: 1.84 UIU/ML
WBC # FLD AUTO: 5.01 K/UL

## 2024-07-29 LAB — APO LP(A) SERPL-MCNC: 91.8 NMOL/L

## 2024-07-31 ENCOUNTER — NON-APPOINTMENT (OUTPATIENT)
Age: 74
End: 2024-07-31

## 2024-08-19 ENCOUNTER — APPOINTMENT (OUTPATIENT)
Dept: HEART AND VASCULAR | Facility: CLINIC | Age: 74
End: 2024-08-19
Payer: COMMERCIAL

## 2024-08-19 VITALS
SYSTOLIC BLOOD PRESSURE: 167 MMHG | DIASTOLIC BLOOD PRESSURE: 98 MMHG | HEIGHT: 64 IN | OXYGEN SATURATION: 97 % | HEART RATE: 67 BPM

## 2024-08-19 VITALS — DIASTOLIC BLOOD PRESSURE: 90 MMHG | SYSTOLIC BLOOD PRESSURE: 150 MMHG

## 2024-08-19 DIAGNOSIS — R06.09 OTHER FORMS OF DYSPNEA: ICD-10-CM

## 2024-08-19 PROCEDURE — 93306 TTE W/DOPPLER COMPLETE: CPT

## 2024-08-20 PROBLEM — R06.09 DYSPNEA ON MINIMAL EXERTION: Status: ACTIVE | Noted: 2020-11-17

## 2024-09-05 ENCOUNTER — NON-APPOINTMENT (OUTPATIENT)
Age: 74
End: 2024-09-05

## 2024-09-06 ENCOUNTER — APPOINTMENT (OUTPATIENT)
Dept: PULMONOLOGY | Facility: CLINIC | Age: 74
End: 2024-09-06
Payer: COMMERCIAL

## 2024-09-06 VITALS
OXYGEN SATURATION: 100 % | RESPIRATION RATE: 16 BRPM | HEART RATE: 76 BPM | HEIGHT: 64 IN | BODY MASS INDEX: 23.22 KG/M2 | SYSTOLIC BLOOD PRESSURE: 118 MMHG | TEMPERATURE: 97.7 F | DIASTOLIC BLOOD PRESSURE: 81 MMHG | WEIGHT: 136 LBS

## 2024-09-06 DIAGNOSIS — F17.210 NICOTINE DEPENDENCE, CIGARETTES, UNCOMPLICATED: ICD-10-CM

## 2024-09-06 DIAGNOSIS — J43.9 EMPHYSEMA, UNSPECIFIED: ICD-10-CM

## 2024-09-06 DIAGNOSIS — R06.09 OTHER FORMS OF DYSPNEA: ICD-10-CM

## 2024-09-06 PROCEDURE — G2211 COMPLEX E/M VISIT ADD ON: CPT | Mod: NC

## 2024-09-06 PROCEDURE — 99214 OFFICE O/P EST MOD 30 MIN: CPT

## 2024-09-06 RX ORDER — ALBUTEROL SULFATE 90 UG/1
108 (90 BASE) INHALANT RESPIRATORY (INHALATION)
Qty: 1 | Refills: 5 | Status: ACTIVE | COMMUNITY
Start: 2024-09-06 | End: 1900-01-01

## 2024-09-06 NOTE — PHYSICAL EXAM
[No Acute Distress] : no acute distress [Normal Appearance] : normal appearance [Normal Rate/Rhythm] : normal rate/rhythm [Normal S1, S2] : normal s1, s2 [No Murmurs] : no murmurs [No Resp Distress] : no resp distress [Clear to Auscultation Bilaterally] : clear to auscultation bilaterally [Benign] : benign [No Clubbing] : no clubbing [No Edema] : no edema

## 2024-09-09 NOTE — HISTORY OF PRESENT ILLNESS
[TextBox_4] : 73 yo F hx of CAD s/p PCI (2021), preDM, smoker, COPD/emphysema, depression referred by cardiology for WEBB. Previously seen by Dr. Colvin (last visit > 2 yrs ago, 7/25/22). Noted w/ Dr. Colvin worsening SOB since moving from [place] to [facility] in 2020, SOB did not improve after stent placement. CT chest with mild bronchiectasis and moderate emphysema but no obstruction on PFTs. She was placed on Spiriva with PRN albuterol. Enrolled in [facility] given smoking hx. However, last CT done 2021. Had quit smoking in 2021, but per cardiology notes pt restarted.  SH: Smoked about 1/2 ppd - 1ppd x 50 years. Quit 6 weeks ago. has dog. 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  Says has been feeling short of breath since April after returning from vacation in April (only felt short of breath twice in [place]), had no leg swelling after returning. intermittent episodes of acute dyspnea where she feels like she is huffing and puffing. Specifically feels short of breath walking two flights of stairs. Says does 10K steps x 3 a week. Her primary exercise is walking. Can walk for 30 minutes daily no problem, can more or less go up a hill. Her primary issue is two flights of stairs, and then she occasionally gets anxious, feels short of breath out of the blue-especially if she gets over heated. Not using any inhalers currently. Still continues to smoke, taking wellbutryin. Her only desire to smoke is at night. Denies cough. She really only used the albuterol. No weight loss. Says she deals with a lot of anxiety, and is afraid given both her sisters are obese. Takes aspirin daily, Taking cymbalta. Requesting albuterol

## 2024-09-09 NOTE — HISTORY OF PRESENT ILLNESS
[TextBox_4] : 75 yo F hx of CAD s/p PCI (2021), preDM, smoker, COPD/emphysema, depression referred by cardiology for WEBB. Previously seen by Dr. Colvin (last visit > 2 yrs ago, 7/25/22). Noted w/ Dr. Colvin worsening SOB since moving [place] [facility] in 2020, SOB did not improve after stent placement. CT chest with mild bronchiectasis and moderate emphysema but no obstruction on PFTs. She was placed on Spiriva with PRN albuterol. Enrolled in [facility] given smoking hx. However, last CT done 2021. Had quit smoking in 2021, but per cardiology notes pt restarted.  SH: Smoked about 1/2 ppd - 1ppd x 50 years. Quit 6 weeks ago. has dog. 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  Says has been feeling short of breath since April after returning from vacation in April (only felt short of breath twice in [place]), had no leg swelling after returning. intermittent episodes of acute dyspnea where she feels like she is huffing and puffing. Specifically feels short of breath walking two flights of stairs. Says does 10K steps x 3 a week. Her primary exercise is walking. Can walk for 30 minutes daily no problem, can more or less go up a hill. Her primary issue is two flights of stairs, and then she occasionally gets anxious, feels short of breath out of the blue-especially if she gets over heated. Not using any inhalers currently. Still continues to smoke, taking wellbutryin. Her only desire to smoke is at night. Denies cough. She really only used the albuterol. No weight loss. Says she deals with a lot of anxiety, and is afraid given both her sisters are obese. Takes aspirin daily, Taking cymbalta. Requesting albuterol

## 2024-09-09 NOTE — ASSESSMENT
[FreeTextEntry1] : 73 yo F hx of CAD s/p PCI (2021), preDM, smoker, COPD/emphysema, depression referred by cardiology for WEBB. Previously seen by Dr. Colvin (last visit > 2 yrs ago, 7/25/22) now presents to pulmonary clinic in setting of dyspnea.  #WEBB #Active smoker at risk of lung cancer  Has 6 months of dyspnea that occurs intermittently not associated with exertion. Has excellent exercise tolerance and able to ambulate on flat ground without dyspnea. Has not been using any inhalers and episodes of shortness of breath self-resolve. Did perceive benefit when using albuterol but no perceived benefit from Spiriva. Suspect dyspnea is likely multi-factorial, in setting of cardiac history with additional aspect of isolated reduced DLCO in 2021 (but normal janette) and possible component of anxiety as well. From pulmonary perspective will plan for PFTs and 6MWT and trial of albuterol, Enrollment in lung cancer screening program. Offered to resume daily maintenance inhaler but she declined at this time. - PFTs 6MWT - trial of albuterol, if using frequently may consider laba/lama - Lung cancer screening - Importance of smoking cessation encouraged - she seems unwilling to quit the 1-2 cig/daily as she feels at her age it is less impactful to quit.
[FreeTextEntry1] : 75 yo F hx of CAD s/p PCI (2021), preDM, smoker, COPD/emphysema, depression referred by cardiology for WEBB. Previously seen by Dr. Colvin (last visit > 2 yrs ago, 7/25/22) now presents to pulmonary clinic in setting of dyspnea.  #WEBB #Active smoker at risk of lung cancer  Has 6 months of dyspnea that occurs intermittently not associated with exertion. Has excellent exercise tolerance and able to ambulate on flat ground without dyspnea. Has not been using any inhalers and episodes of shortness of breath self-resolve. Did perceive benefit when using albuterol but no perceived benefit from Spiriva. Suspect dyspnea is likely multi-factorial, in setting of cardiac history with additional aspect of isolated reduced DLCO in 2021 (but normal janette) and possible component of anxiety as well. From pulmonary perspective will plan for PFTs and 6MWT and trial of albuterol, Enrollment in lung cancer screening program. Offered to resume daily maintenance inhaler but she declined at this time. - PFTs 6MWT - trial of albuterol, if using frequently may consider laba/lama - Lung cancer screening - Importance of smoking cessation encouraged - she seems unwilling to quit the 1-2 cig/daily as she feels at her age it is less impactful to quit.
[FreeTextEntry1] : 75 yo F hx of CAD s/p PCI (2021), preDM, smoker, COPD/emphysema, depression referred by cardiology for WEBB. Previously seen by Dr. Colvin (last visit > 2 yrs ago, 7/25/22) now presents to pulmonary clinic in setting of dyspnea.  #WEBB #Active smoker at risk of lung cancer  Has 6 months of dyspnea that occurs intermittently not associated with exertion. Has excellent exercise tolerance and able to ambulate on flat ground without dyspnea. Has not been using any inhalers and episodes of shortness of breath self-resolve. Did perceive benefit when using albuterol but no perceived benefit from Spiriva. Suspect dyspnea is likely multi-factorial, in setting of cardiac history with additional aspect of isolated reduced DLCO in 2021 (but normal janette) and possible component of anxiety as well. From pulmonary perspective will plan for PFTs and 6MWT and trial of albuterol, Enrollment in lung cancer screening program. Offered to resume daily maintenance inhaler but she declined at this time. - PFTs 6MWT - trial of albuterol, if using frequently may consider laba/lama - Lung cancer screening - Importance of smoking cessation encouraged - she seems unwilling to quit the 1-2 cig/daily as she feels at her age it is less impactful to quit.
Unknown

## 2024-09-09 NOTE — HISTORY OF PRESENT ILLNESS
[TextBox_4] : 75 yo F hx of CAD s/p PCI (2021), preDM, smoker, COPD/emphysema, depression referred by cardiology for WEBB. Previously seen by Dr. Colvin (last visit > 2 yrs ago, 7/25/22). Noted w/ Dr. Colvin worsening SOB since moving [place] [facility] in 2020, SOB did not improve after stent placement. CT chest with mild bronchiectasis and moderate emphysema but no obstruction on PFTs. She was placed on Spiriva with PRN albuterol. Enrolled in hospitals given smoking hx. However, last CT done 2021. Had quit smoking in 2021, but per cardiology notes pt restarted.  SH: Smoked about 1/2 ppd - 1ppd x 50 years. Quit 6 weeks ago. has dog. 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  Says has been feeling short of breath since April after returning from vacation in April (only felt short of breath twice in [place]), had no leg swelling after returning. intermittent episodes of acute dyspnea where she feels like she is huffing and puffing. Specifically feels short of breath walking two flights of stairs. Says does 10K steps x 3 a week. Her primary exercise is walking. Can walk for 30 minutes daily no problem, can more or less go up a hill. Her primary issue is two flights of stairs, and then she occasionally gets anxious, feels short of breath out of the blue-especially if she gets over heated. Not using any inhalers currently. Still continues to smoke, taking wellbutryin. Her only desire to smoke is at night. Denies cough. She really only used the albuterol. No weight loss. Says she deals with a lot of anxiety, and is afraid given both her sisters are obese. Takes aspirin daily, Taking cymbalta. Requesting albuterol

## 2024-09-09 NOTE — PROCEDURE
[FreeTextEntry1] : CT angio heart 9-20-21: Since 5-2021 slight increased opacity in the inferior lingula, probable atelectasis. unchanged emphysema, unchanged small hiatal hernia *** CT chest lung cancer screening 12- cylindrical bronchiectasis b/l. moderate centrilobular emphysema. small areas of subsegmental atelectasis in the RML and lingula. few bilateral micronodules - LUNG RADS 1. *** PFT 12-20-21: FVC 77%, FEV1 88%, FEV1/FVC 0.87. No significant bronchodilator response. TLC 95%m, DLCO 50% *** CBC 7/27/24 - Normal H/H, no eos (120 total count) *** TTE 8/23/24 - Grade I DD, normal RV size/function, normal atria, PASP 25mmHg with trace TR

## 2024-09-09 NOTE — END OF VISIT
[] : Fellow [Time Spent: ___ minutes] : I have spent [unfilled] minutes of time on the encounter which excludes teaching and separately reported services. [FreeTextEntry3] : Multifactorial dyspnea, very episodic, may be component of anxiety but has been undertreating her airway disease. Advised to resume inhaler, she felt spiriva did not help and only interested in albuterol. Explained different classes of inhalers, she prefers PRN albuterol, will reassess how often she is requiring and re-address need for maintenance inhaler. Repeat PFT and 6MWT. Did feel dizzy at end of visit with standing, Orthostatics negative. Needs to re-enroll in lung cancer screening, over due for CT chest. RTC aftetr above

## 2024-10-07 ENCOUNTER — APPOINTMENT (OUTPATIENT)
Dept: ENDOCRINOLOGY | Facility: CLINIC | Age: 74
End: 2024-10-07
Payer: COMMERCIAL

## 2024-10-07 VITALS
WEIGHT: 134 LBS | BODY MASS INDEX: 23 KG/M2 | DIASTOLIC BLOOD PRESSURE: 84 MMHG | HEART RATE: 76 BPM | SYSTOLIC BLOOD PRESSURE: 148 MMHG

## 2024-10-07 DIAGNOSIS — R73.03 PREDIABETES.: ICD-10-CM

## 2024-10-07 LAB — GLUCOSE BLDC GLUCOMTR-MCNC: 94

## 2024-10-07 PROCEDURE — 82962 GLUCOSE BLOOD TEST: CPT

## 2024-10-07 PROCEDURE — 99203 OFFICE O/P NEW LOW 30 MIN: CPT | Mod: 25

## 2024-10-18 ENCOUNTER — APPOINTMENT (OUTPATIENT)
Dept: ORTHOPEDIC SURGERY | Facility: CLINIC | Age: 74
End: 2024-10-18

## 2024-10-18 DIAGNOSIS — M25.561 PAIN IN RIGHT KNEE: ICD-10-CM

## 2024-10-18 DIAGNOSIS — M25.562 PAIN IN RIGHT KNEE: ICD-10-CM

## 2024-10-18 DIAGNOSIS — M79.601 PAIN IN RIGHT ARM: ICD-10-CM

## 2024-10-18 PROCEDURE — 20610 DRAIN/INJ JOINT/BURSA W/O US: CPT | Mod: 50

## 2024-10-18 PROCEDURE — 73060 X-RAY EXAM OF HUMERUS: CPT

## 2024-10-18 PROCEDURE — 73562 X-RAY EXAM OF KNEE 3: CPT | Mod: 50

## 2024-10-18 PROCEDURE — 99214 OFFICE O/P EST MOD 30 MIN: CPT | Mod: 25

## 2024-10-18 RX ORDER — CELECOXIB 200 MG/1
200 CAPSULE ORAL TWICE DAILY
Qty: 60 | Refills: 1 | Status: ACTIVE | COMMUNITY
Start: 2024-10-18 | End: 1900-01-01

## 2024-10-28 ENCOUNTER — APPOINTMENT (OUTPATIENT)
Dept: HEART AND VASCULAR | Facility: CLINIC | Age: 74
End: 2024-10-28

## 2024-12-24 PROBLEM — F10.90 ALCOHOL USE: Status: ACTIVE | Noted: 2020-11-17

## 2025-01-24 ENCOUNTER — APPOINTMENT (OUTPATIENT)
Dept: ORTHOPEDIC SURGERY | Facility: CLINIC | Age: 75
End: 2025-01-24

## 2025-02-18 ENCOUNTER — NON-APPOINTMENT (OUTPATIENT)
Age: 75
End: 2025-02-18

## 2025-02-18 ENCOUNTER — APPOINTMENT (OUTPATIENT)
Dept: PULMONOLOGY | Facility: CLINIC | Age: 75
End: 2025-02-18
Payer: COMMERCIAL

## 2025-02-18 VITALS
RESPIRATION RATE: 12 BRPM | BODY MASS INDEX: 23.05 KG/M2 | HEART RATE: 72 BPM | SYSTOLIC BLOOD PRESSURE: 150 MMHG | WEIGHT: 135 LBS | TEMPERATURE: 97.2 F | HEIGHT: 64 IN | OXYGEN SATURATION: 97 % | DIASTOLIC BLOOD PRESSURE: 92 MMHG

## 2025-02-18 PROCEDURE — 94727 GAS DIL/WSHOT DETER LNG VOL: CPT

## 2025-02-18 PROCEDURE — ZZZZZ: CPT

## 2025-02-18 PROCEDURE — 94010 BREATHING CAPACITY TEST: CPT

## 2025-02-18 PROCEDURE — 94729 DIFFUSING CAPACITY: CPT

## 2025-02-18 PROCEDURE — 94618 PULMONARY STRESS TESTING: CPT

## 2025-02-18 PROCEDURE — 99214 OFFICE O/P EST MOD 30 MIN: CPT | Mod: 25

## 2025-02-18 RX ORDER — NICOTINE POLACRILEX 2 MG/1
2 LOZENGE ORAL
Qty: 1 | Refills: 2 | Status: ACTIVE | COMMUNITY
Start: 2025-02-18 | End: 1900-01-01

## 2025-02-20 ENCOUNTER — APPOINTMENT (OUTPATIENT)
Dept: DERMATOLOGY | Facility: CLINIC | Age: 75
End: 2025-02-20
Payer: COMMERCIAL

## 2025-02-20 DIAGNOSIS — L82.1 OTHER SEBORRHEIC KERATOSIS: ICD-10-CM

## 2025-02-20 DIAGNOSIS — L82.0 INFLAMED SEBORRHEIC KERATOSIS: ICD-10-CM

## 2025-02-20 PROCEDURE — 99203 OFFICE O/P NEW LOW 30 MIN: CPT | Mod: 25

## 2025-02-20 PROCEDURE — 17110 DESTRUCTION B9 LES UP TO 14: CPT

## 2025-03-17 ENCOUNTER — APPOINTMENT (OUTPATIENT)
Dept: CT IMAGING | Facility: HOSPITAL | Age: 75
End: 2025-03-17

## 2025-03-28 ENCOUNTER — APPOINTMENT (OUTPATIENT)
Dept: INTERNAL MEDICINE | Facility: CLINIC | Age: 75
End: 2025-03-28

## 2025-03-28 VITALS
BODY MASS INDEX: 23.34 KG/M2 | DIASTOLIC BLOOD PRESSURE: 93 MMHG | WEIGHT: 136 LBS | OXYGEN SATURATION: 98 % | HEART RATE: 69 BPM | SYSTOLIC BLOOD PRESSURE: 128 MMHG

## 2025-03-28 DIAGNOSIS — Z12.11 ENCOUNTER FOR SCREENING FOR MALIGNANT NEOPLASM OF COLON: ICD-10-CM

## 2025-03-28 DIAGNOSIS — F32.A DEPRESSION, UNSPECIFIED: ICD-10-CM

## 2025-03-28 DIAGNOSIS — F17.210 NICOTINE DEPENDENCE, CIGARETTES, UNCOMPLICATED: ICD-10-CM

## 2025-03-28 DIAGNOSIS — Z23 ENCOUNTER FOR IMMUNIZATION: ICD-10-CM

## 2025-03-28 DIAGNOSIS — I25.10 ATHEROSCLEROTIC HEART DISEASE OF NATIVE CORONARY ARTERY W/OUT ANGINA PECTORIS: ICD-10-CM

## 2025-03-28 DIAGNOSIS — R42 DIZZINESS AND GIDDINESS: ICD-10-CM

## 2025-03-28 DIAGNOSIS — I10 ESSENTIAL (PRIMARY) HYPERTENSION: ICD-10-CM

## 2025-03-28 DIAGNOSIS — J43.9 EMPHYSEMA, UNSPECIFIED: ICD-10-CM

## 2025-03-28 PROCEDURE — G0444 DEPRESSION SCREEN ANNUAL: CPT | Mod: 59

## 2025-03-28 PROCEDURE — G0296 VISIT TO DETERM LDCT ELIG: CPT

## 2025-03-28 PROCEDURE — 90750 HZV VACC RECOMBINANT IM: CPT

## 2025-03-28 PROCEDURE — 99214 OFFICE O/P EST MOD 30 MIN: CPT | Mod: 25

## 2025-03-28 PROCEDURE — 36415 COLL VENOUS BLD VENIPUNCTURE: CPT

## 2025-03-28 PROCEDURE — 90471 IMMUNIZATION ADMIN: CPT

## 2025-03-28 PROCEDURE — 99397 PER PM REEVAL EST PAT 65+ YR: CPT | Mod: 25

## 2025-03-28 RX ORDER — BUPROPION HYDROCHLORIDE 300 MG/1
300 TABLET, EXTENDED RELEASE ORAL
Qty: 90 | Refills: 1 | Status: ACTIVE | COMMUNITY
Start: 2025-03-28 | End: 1900-01-01

## 2025-03-29 LAB
ALBUMIN SERPL ELPH-MCNC: 4.2 G/DL
ALP BLD-CCNC: 100 U/L
ALT SERPL-CCNC: 15 U/L
ANION GAP SERPL CALC-SCNC: 12 MMOL/L
AST SERPL-CCNC: 22 U/L
BASOPHILS # BLD AUTO: 0.04 K/UL
BASOPHILS NFR BLD AUTO: 0.7 %
BILIRUB SERPL-MCNC: 0.2 MG/DL
BUN SERPL-MCNC: 12 MG/DL
CALCIUM SERPL-MCNC: 9.8 MG/DL
CHLORIDE SERPL-SCNC: 102 MMOL/L
CHOLEST SERPL-MCNC: 183 MG/DL
CO2 SERPL-SCNC: 27 MMOL/L
CREAT SERPL-MCNC: 0.83 MG/DL
EGFRCR SERPLBLD CKD-EPI 2021: 73 ML/MIN/1.73M2
EOSINOPHIL # BLD AUTO: 0.23 K/UL
EOSINOPHIL NFR BLD AUTO: 4.1 %
ESTIMATED AVERAGE GLUCOSE: 126 MG/DL
GLUCOSE SERPL-MCNC: 78 MG/DL
HBA1C MFR BLD HPLC: 6 %
HCT VFR BLD CALC: 37.5 %
HCV AB SER QL: NONREACTIVE
HCV S/CO RATIO: 0.53 S/CO
HDLC SERPL-MCNC: 78 MG/DL
HGB BLD-MCNC: 12.4 G/DL
IMM GRANULOCYTES NFR BLD AUTO: 0.2 %
LDLC SERPL-MCNC: 93 MG/DL
LYMPHOCYTES # BLD AUTO: 1.8 K/UL
LYMPHOCYTES NFR BLD AUTO: 32.2 %
MAN DIFF?: NORMAL
MCHC RBC-ENTMCNC: 29.2 PG
MCHC RBC-ENTMCNC: 33.1 G/DL
MCV RBC AUTO: 88.2 FL
MONOCYTES # BLD AUTO: 0.42 K/UL
MONOCYTES NFR BLD AUTO: 7.5 %
NEUTROPHILS # BLD AUTO: 3.09 K/UL
NEUTROPHILS NFR BLD AUTO: 55.3 %
NONHDLC SERPL-MCNC: 105 MG/DL
PLATELET # BLD AUTO: 279 K/UL
POTASSIUM SERPL-SCNC: 4.1 MMOL/L
PROT SERPL-MCNC: 6.6 G/DL
RBC # BLD: 4.25 M/UL
RBC # FLD: 14.9 %
SODIUM SERPL-SCNC: 141 MMOL/L
TRIGL SERPL-MCNC: 70 MG/DL
TSH SERPL-ACNC: 1.98 UIU/ML
WBC # FLD AUTO: 5.59 K/UL

## 2025-04-23 ENCOUNTER — APPOINTMENT (OUTPATIENT)
Dept: CT IMAGING | Facility: HOSPITAL | Age: 75
End: 2025-04-23

## 2025-05-07 ENCOUNTER — NON-APPOINTMENT (OUTPATIENT)
Age: 75
End: 2025-05-07

## 2025-05-07 ENCOUNTER — APPOINTMENT (OUTPATIENT)
Dept: HEART AND VASCULAR | Facility: CLINIC | Age: 75
End: 2025-05-07
Payer: COMMERCIAL

## 2025-05-07 VITALS
DIASTOLIC BLOOD PRESSURE: 77 MMHG | BODY MASS INDEX: 23.05 KG/M2 | TEMPERATURE: 97.2 F | SYSTOLIC BLOOD PRESSURE: 133 MMHG | OXYGEN SATURATION: 97 % | HEART RATE: 80 BPM | WEIGHT: 135 LBS | HEIGHT: 64 IN

## 2025-05-07 PROCEDURE — 99214 OFFICE O/P EST MOD 30 MIN: CPT | Mod: 25

## 2025-05-07 PROCEDURE — 93000 ELECTROCARDIOGRAM COMPLETE: CPT

## 2025-05-12 ENCOUNTER — NON-APPOINTMENT (OUTPATIENT)
Age: 75
End: 2025-05-12

## 2025-05-20 ENCOUNTER — EMERGENCY (EMERGENCY)
Facility: HOSPITAL | Age: 75
LOS: 1 days | End: 2025-05-20
Admitting: EMERGENCY MEDICINE
Payer: COMMERCIAL

## 2025-05-20 VITALS
WEIGHT: 136.91 LBS | HEART RATE: 80 BPM | RESPIRATION RATE: 17 BRPM | HEIGHT: 65 IN | TEMPERATURE: 98 F | OXYGEN SATURATION: 99 % | SYSTOLIC BLOOD PRESSURE: 137 MMHG | DIASTOLIC BLOOD PRESSURE: 95 MMHG

## 2025-05-20 PROCEDURE — 99284 EMERGENCY DEPT VISIT MOD MDM: CPT

## 2025-05-20 PROCEDURE — 99283 EMERGENCY DEPT VISIT LOW MDM: CPT

## 2025-05-20 RX ORDER — CIPROFLOXACIN AND DEXAMETHASONE 3; 1 MG/ML; MG/ML
4 SUSPENSION/ DROPS AURICULAR (OTIC)
Qty: 1 | Refills: 0
Start: 2025-05-20 | End: 2025-05-26

## 2025-05-20 RX ORDER — METHYLPREDNISOLONE ACETATE 80 MG/ML
1 INJECTION, SUSPENSION INTRA-ARTICULAR; INTRALESIONAL; INTRAMUSCULAR; SOFT TISSUE
Qty: 21 | Refills: 0
Start: 2025-05-20 | End: 2025-05-26

## 2025-05-20 RX ORDER — BACITRACIN 500 UNIT/G
1 OINTMENT (GRAM) OPHTHALMIC (EYE)
Qty: 1 | Refills: 0
Start: 2025-05-20 | End: 2025-05-26

## 2025-05-20 NOTE — ED PROVIDER NOTE - OBJECTIVE STATEMENT
75 F no pertinent pmh p/w b/l eye swelling and redness x 3 days.  pt reports getting eyelash extensions Saturday and following day had redness/itching and irritation so removed eyelashes and took benadryl- initially helped but not anymore.  has been applying cool compresses.  reports now w/ severe itching causing increased redness and swelling around the eyes. wore contacts after sxs started but took them out today.  no vision changes/vision loss.  denies f/c, HA, dizziness, ophthalmoplegia, discharge from eyes, uri sxs, cough, chest pain, sob, dysphagia, nv, trauma

## 2025-05-20 NOTE — ED PROVIDER NOTE - PATIENT PORTAL LINK FT
You can access the FollowMyHealth Patient Portal offered by [facility] by registering at the following website: [email or web address]. By joining [facility]’s FollowMyHealth portal, you will also be able to view your health information using other applications (apps) compatible with our system.

## 2025-05-20 NOTE — ED ADULT NURSE NOTE - OBJECTIVE STATEMENT
Presents for redness and itching to B eyelids x 4 days s/p using eyelash glue, suspected allergic reaction, unrelieved with po benadryl. Denies airway involvement.     On assessment- AOx4, breathing even and unlabored on RA, no apparent distress, VSS in triage, able to speak in clear coherent sentences, steady gait unassisted, neuro intact with no apparent facial asymmetry, PERRLA. + Redness, mild erythema B lids.

## 2025-05-20 NOTE — ED PROVIDER NOTE - DISPOSITION TYPE
PATIENT NAME: Bairon Calix  PATIENT MRN: [id number]  DATE OF SURGERY: 9/20/2021    TITLE OF PROCEDURE: Genicular Nerve Radiofrequency Ablation    NERVES INVOLVED:  1. Superolateral genicular branch from the vastus lateralis   2. Superomedial genicular branch from the vastus medialis   3. Inferomedial genicular branch from the saphenous nerve    INDICATION: Knee pain by history and physical exam.  Greater than 80% pain relief was obtained by the first diagnostic block of these nerves    SIDE: Bilateral    PREOPERATIVE DIAGNOSES: Chronic Knee Pain    POSTOPERATIVE DIAGNOSES:  Same    LOCATION: [facility]    SURGEON: Oksana    ANESTHESIA:  Local    DESCRIPTION OF OPERATIVE PROCEDURE:  Informed consent was obtained. The patient was brought to the Procedure Room and he/she positioned themselves to their comfort and optimal positioning for procedure.  Time-out was conducted with all members of the care team.  Standard ASA monitors were placed. Standard prep and drape were performed.    Supine Position.   The areas overlying the superior medial, superior lateral, and inferior medial genicular nerves was identified with fluoroscopy. Following this, a 100 mm, 18 gauge, cooled radiofrequency needle with a 4 mm active tip  was was directed to the periosteal areas connecting the shaft of the femur the bilateral epicondyles and the shaft of the tibia to the medial epicondyle. Needle location was verified in AP/LAT projections. Motor stimulation at 2 Hz was performed with no evidence of distal muscle contraction at each level.  Prior to lesioning 1 mL of 1% lidocaine and 2mg of Dexamethasone was injected at each level.  The patient then received one 150 second lesioning cycle at 80 degrees centigrade at each level. The probe was then allowed to cool prior to withdrawing.  Then, 1cc of 1% lidocaine was then injected as the needle was withdrawn to provide deep and superficial anesthesia.      The surgical site preparation was washed off of  the patient. Band-Aids were applied. The patient was brought to the recovery area. The patient did very well and the procedure results were discussed.  Standard discharge instructions were given to the patient.  The patient knows how to contact the clinic should they have any questions or problems.     COMPLICATIONS: None. Procedure took over 45 minutes due to delays with patient positioning.    EBL: minimal    URINE OUTPUT: not monitored    FLUIDS: none    SPECIMENS: None    IMPLANTS: None    PLAN: Followup in the clinic in two weeks.       DISCHARGE

## 2025-05-20 NOTE — ED ADULT TRIAGE NOTE - CHIEF COMPLAINT QUOTE
Pt c/o eye swelling and redness x Saturday. "I think I am allergic to the eyelash glue. I have been taking benadryl, but it isn't working". Pt noted with b/l eyelid swelling and redness. Denies vision changes, drainage.

## 2025-05-20 NOTE — ED PROVIDER NOTE - PHYSICAL EXAMINATION
Gen: well appearing, no acute distress  Skin: warm/dry  HEENT: nc, + inferior periorbital edema/erythema, superficial abrasion R inferior orbit, no streaking/induration/discharge or warmth, no facial ttp, eomi, perrla, mild scleral injection, no discharge, + uptake fluoroscene R eye 6olock position, VA corrected w/ glasses 20/20.  Resp: breathing comfortably, speaking in full sentences, no dyspnea  Neuro: alert/oriented, ambulatory

## 2025-05-20 NOTE — ED PROVIDER NOTE - CLINICAL SUMMARY MEDICAL DECISION MAKING FREE TEXT BOX
75 F no pertinent pmh p/w b/l eye swelling and redness x 3 days s/p eyelash extensions since removed.  + contacts but took them out today.  took benadryl but no longer helping.  on exam vss, afebrile, nad, HEENT: nc, + inferior periorbital edema/erythema, superficial abrasion R inferior orbit, no streaking/induration/discharge or warmth, no facial ttp, eomi, perrla, mild scleral injection, no discharge, + uptake fluoroscene R eye 6olock position, VA corrected w/ glasses 20/20.   suspect contact dermatitis from eyelash extensions, + R corneal abrasion likely from rubbing eyes/contacts, do not think preseptal cellulitis as no pain only itching/swelling.  will dc w/ ciprodex drops, bacitracin ophthalmic for lower lids and medrol dose pack.  can continue benadryl.  f/u with pmd/derm/ophtho. discussed strict return parameters

## 2025-05-20 NOTE — ED PROVIDER NOTE - NSFOLLOWUPINSTRUCTIONS_ED_ALL_ED_FT
Please rest and remain well hydrated with plenty of fluids.  You can take motrin 600-800mg and tylenol 650mg every 3 hours, switching between the two for pain    Use eye drops and topical ointment as prescribed    Take steroids as prescribed    Please call to arrange follow up with primary care doctor within one week    You should also follow up with dermatologist and eye doctor    Contact Dermatitis  Dermatitis is redness, soreness, and swelling (inflammation) of the skin. Contact dermatitis is a reaction to certain substances that touch the skin. There are two types of this condition:  Irritant contact dermatitis. This is the most common type. It happens when something irritates your skin, such as when your hands get dry from washing them too often with soap. You can get this type of reaction even if you have not been exposed to the irritant before.  Allergic contact dermatitis. This type is caused by a substance that you are allergic to, such as poison ivy. It occurs when you have been exposed to the substance (allergen) and form a sensitivity to it. In some cases, the reaction may start soon after your first exposure to the allergen. In other cases, it may not start until you are exposed to the allergen again. It may then occur every time you are exposed to the allergen in the future.  What are the causes?  Irritant contact dermatitis is often caused by exposure to:  Makeup.  Soaps, detergents, and bleaches.  Acids.  Metal salts, such as nickel.  Allergic contact dermatitis is often caused by exposure to:  Poisonous plants.  Chemicals.  Jewelry.  Latex.  Medicines.  Preservatives in products, such as clothes.  What increases the risk?  You are more likely to get this condition if you have:  A job that exposes you to irritants or allergens.  Certain medical conditions. These include asthma and eczema.  What are the signs or symptoms?  A person's hands, showing areas of redness and blisters caused by contact dermatitis.  Symptoms of this condition may occur in any place on your body that has been touched by the irritant.  Symptoms include:  Dryness, flaking, or cracking.  Redness.  Itching.  Pain or a burning feeling.  Blisters.  Drainage of small amounts of blood or clear fluid from skin cracks.  With allergic contact dermatitis, there may also be swelling in areas such as the eyelids, mouth, or genitals.    How is this diagnosed?  This condition is diagnosed with a medical history and physical exam.  A patch skin test may be done to help figure out the cause.  If the condition is related to your job, you may need to see an expert in health problems in the workplace (occupational medicine specialist).  How is this treated?  This condition is treated by staying away from the cause of the reaction and protecting your skin from further contact. Treatment may also include:  Steroid creams or ointments. Steroid medicines may need be taken by mouth (orally) in more severe cases.  Antibiotics or medicines applied to the skin to kill bacteria (antibacterial ointments). These may be needed if a skin infection is present.  Antihistamines. These may be taken orally or put on as a lotion to ease itching.  A bandage (dressing).  Follow these instructions at home:  Skin care    Moisturize your skin as needed.  Put cool, wet cloths (cool compresses) on the affected areas.  Try applying baking soda paste to your skin. Stir water into baking soda until it has the consistency of a paste.  Do not scratch your skin. Avoid friction to the affected area.  Avoid the use of soaps, perfumes, and dyes.  Check the affected areas every day for signs of infection. Check for:  More redness, swelling, or pain.  More fluid or blood.  Warmth.  Pus or a bad smell.  Medicines    Take or apply over-the-counter and prescription medicines only as told by your health care provider.  If you were prescribed antibiotics, take or apply them as told by your health care provider. Do not stop using the antibiotic even if you start to feel better.  Bathing    Try taking a bath with:  Epsom salts. Follow the instructions on the packaging. You can get these at your local pharmacy or grocery store.  Baking soda. Pour a small amount into the bath as told by your health care provider.  Colloidal oatmeal. Follow the instructions on the packaging. You can get this at your local pharmacy or grocery store.  Bathe less often. This may mean bathing every other day.  Bathe in lukewarm water. Avoid using hot water.  Bandage care    If you were given a dressing, change it as told by your health care provider.  Wash your hands with soap and water for at least 20 seconds before and after you change your dressing. If soap and water are not available, use hand .  General instructions    Avoid the substance that caused your reaction. If you do not know what caused it, keep a journal to try to track what caused it. Write down:  What you eat and drink.  What cosmetics you use.  What you wear in the affected area. This includes jewelry.  Contact a health care provider if:  Your condition does not get better with treatment.  Your condition gets worse.  You have any signs of infection.  You have a fever.  You have new symptoms.  Your bone or joint under the affected area becomes painful after the skin has healed.  Get help right away if:  You notice red streaks coming from the affected area.  The affected area turns darker.  You have trouble breathing.  This information is not intended to replace advice given to you by your health care provider. Make sure you discuss any questions you have with your health care provider.

## 2025-05-22 DIAGNOSIS — L30.9 DERMATITIS, UNSPECIFIED: ICD-10-CM
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
## 2025-05-22 DIAGNOSIS — S00.211A ABRASION OF RIGHT EYELID AND PERIOCULAR AREA, INITIAL ENCOUNTER: ICD-10-CM
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
## 2025-05-22 DIAGNOSIS — H57.89 OTHER SPECIFIED DISORDERS OF EYE AND ADNEXA: ICD-10-CM

## 2025-06-02 ENCOUNTER — RESULT REVIEW (OUTPATIENT)
Age: 75
End: 2025-06-02

## 2025-06-19 VITALS
HEART RATE: 65 BPM | WEIGHT: 134.92 LBS | RESPIRATION RATE: 16 BRPM | DIASTOLIC BLOOD PRESSURE: 98 MMHG | OXYGEN SATURATION: 100 % | SYSTOLIC BLOOD PRESSURE: 168 MMHG | HEIGHT: 65 IN | TEMPERATURE: 97 F

## 2025-06-19 NOTE — H&P ADULT - ASSESSMENT
75 yr old F current smoker, PMHx of hyperlipidemia, preDM, CAD s/p prior PCIs, COPD, depression with CCS Angina Class II equivalent symptoms and abnormal CCTA who presents to [facility] for recommended cardiac catheterization with possible intervention.       ASA:III			Mallampati class: II    Sedation Plan: Moderate    Patient Is Suitable Candidate For Sedation: Yes    Cardiac: Risks & benefits of procedure and alternative therapy have been explained to the patient &/or HCP including but not limited to: allergic reaction, bleeding, infection, arrhythmia, renal and vascular compromise, limb damage, MI, CVA, emergent CABG and death. Informed consent obtained and in chart.    --precath/consented, will load with ASA 81mg PO x 1 dose and Plavix 600mg PO x 1 dose prior to case.  --labs reviewed, IVF hydration ordered.

## 2025-06-19 NOTE — H&P ADULT - HISTORY OF PRESENT ILLNESS
Cardiologist: Dr. Arvizu  Pharmacy:  Escort:       75 yr old F smoker, ETOH abuse with PMHx of hyperlipidemia, preDM, CAD s/p prior PCIs (most recently 8/2023 with JOHN pLAD, patent LCx/RCA stent), COPD, depression who initially presented to cardiologist c/o progressively worsening dyspnea with moderate exertion x several months. Associated symptoms including fatigue and positional lightheadedness.    Pt denies any N/V, diaphoresis, palpitations, chest pain, PND, orthopnea, LE edema, recent travel or sick contacts.     8/2024 revealed normal LV systolic function, EF:66%. Mild Grade I LV DD.     CCTA 6/2/25 revealed patent stents in mLAD, pLCx and mRCA. Moderate OM1 stenosis, mild to moderate pRCA stenosis. Calcific plaque obscures the lumen in D1, precluding stenosis evaluation.    In light of patient's risk factors, CCS Angina Class ___symptoms, abnormal CCTA, patient now presents for recommended cardiac catheterization with possible intervention.           CATH Hx:  @[facility] 8/17/23: JOHN x1 to p-mLAD 80%, prior mLCx stent patent, prior mRCA stent patent. Cardiologist: Dr. Arvizu  Pharmacy: [place] [address]  Escort: Marta Shah [phone]      75 yr old F current smoker, PMHx of hyperlipidemia, preDM, CAD s/p prior PCIs (most recently 8/2023 with JOHN pLAD, patent LCx/RCA stent), COPD, depression who initially presented to cardiologist c/o progressively worsening dyspnea with moderate exertion x several months. Associated symptoms including fatigue and positional lightheadedness. Pt denies any N/V, diaphoresis, palpitations, chest pain, PND, orthopnea, LE edema, recent travel or sick contacts.     8/2024 revealed normal LV systolic function, EF:66%. Mild Grade I LV DD.   CCTA 6/2/25 revealed patent stents in mLAD, pLCx and mRCA. Moderate OM1 stenosis, mild to moderate pRCA stenosis. Calcific plaque obscures the lumen in D1, precluding stenosis evaluation.    In light of patient's risk factors, CCS Angina Class II symptoms, abnormal CCTA, patient now presents for recommended cardiac catheterization with possible intervention.           CATH Hx:  @[facility] 8/17/23: JOHN x1 to p-mLAD 80%, prior mLCx stent patent, prior mRCA stent patent.

## 2025-06-19 NOTE — H&P ADULT - PSYCHIATRIC
ADMITTED TO [facility] AND ORIENTED TO UNIT. SCDS ON. FALL AND ALLERGY BANDS ON. PT VERBALIZED APPROVAL FOR FIRST NAME, LAST INITIAL AND PHYSICIAN NAME ON UNIT WHITEBOARD. normal/normal affect/alert and oriented x3/normal behavior

## 2025-06-26 ENCOUNTER — OUTPATIENT (OUTPATIENT)
Dept: OUTPATIENT SERVICES | Facility: HOSPITAL | Age: 75
LOS: 1 days | Discharge: HOME | End: 2025-06-26
Payer: COMMERCIAL

## 2025-06-26 PROCEDURE — C1769: CPT

## 2025-06-26 PROCEDURE — 85610 PROTHROMBIN TIME: CPT

## 2025-06-26 PROCEDURE — 0523T NTRAPX C FFR W/3D FUNCJL MAP: CPT

## 2025-06-26 PROCEDURE — 93458 L HRT ARTERY/VENTRICLE ANGIO: CPT | Mod: 26

## 2025-06-26 PROCEDURE — 85347 COAGULATION TIME ACTIVATED: CPT

## 2025-06-26 PROCEDURE — 85730 THROMBOPLASTIN TIME PARTIAL: CPT

## 2025-06-26 PROCEDURE — C1894: CPT

## 2025-06-26 PROCEDURE — 82550 ASSAY OF CK (CPK): CPT

## 2025-06-26 PROCEDURE — 93458 L HRT ARTERY/VENTRICLE ANGIO: CPT

## 2025-06-26 PROCEDURE — 93010 ELECTROCARDIOGRAM REPORT: CPT

## 2025-06-26 PROCEDURE — 36415 COLL VENOUS BLD VENIPUNCTURE: CPT

## 2025-06-26 PROCEDURE — 83735 ASSAY OF MAGNESIUM: CPT

## 2025-06-26 PROCEDURE — 93005 ELECTROCARDIOGRAM TRACING: CPT

## 2025-06-26 PROCEDURE — 85025 COMPLETE CBC W/AUTO DIFF WBC: CPT

## 2025-06-26 PROCEDURE — 99152 MOD SED SAME PHYS/QHP 5/>YRS: CPT

## 2025-06-26 PROCEDURE — 82553 CREATINE MB FRACTION: CPT

## 2025-06-26 PROCEDURE — 80053 COMPREHEN METABOLIC PANEL: CPT

## 2025-06-26 PROCEDURE — C1887: CPT

## 2025-06-26 RX ORDER — CALAMINE 8% AND ZINC OXIDE 8% 160 MG/ML
1 LOTION TOPICAL ONCE
Refills: 0 | Status: DISCONTINUED | OUTPATIENT
Start: 2025-06-26 | End: 2025-06-26

## 2025-06-26 RX ORDER — ROSUVASTATIN CALCIUM 20 MG/1
1 TABLET, FILM COATED ORAL
Refills: 0 | DISCHARGE

## 2025-06-26 RX ORDER — CLOPIDOGREL BISULFATE 75 MG/1
600 TABLET, FILM COATED ORAL ONCE
Refills: 0 | Status: COMPLETED | OUTPATIENT
Start: 2025-06-26 | End: 2025-06-26

## 2025-06-26 RX ORDER — ASPIRIN 325 MG
81 TABLET ORAL ONCE
Refills: 0 | Status: COMPLETED | OUTPATIENT
Start: 2025-06-26 | End: 2025-06-26

## 2025-06-26 RX ORDER — MAGNESIUM, ALUMINUM HYDROXIDE 200-200 MG
30 TABLET,CHEWABLE ORAL ONCE
Refills: 0 | Status: COMPLETED | OUTPATIENT
Start: 2025-06-26 | End: 2025-06-26

## 2025-06-26 RX ADMIN — CLOPIDOGREL BISULFATE 600 MILLIGRAM(S): 75 TABLET, FILM COATED ORAL at 16:53

## 2025-06-26 RX ADMIN — Medication 81 MILLIGRAM(S): at 16:54

## 2025-06-26 RX ADMIN — Medication 10 MILLIGRAM(S): at 20:23

## 2025-06-26 RX ADMIN — Medication 75 MILLILITER(S): at 16:54

## 2025-06-26 RX ADMIN — Medication 500 MILLILITER(S): at 16:54

## 2025-06-26 RX ADMIN — Medication 30 MILLILITER(S): at 17:20

## 2025-06-26 NOTE — PROGRESS NOTE ADULT - SUBJECTIVE AND OBJECTIVE BOX
Interventional Cardiology PA SDA Discharge Note    Patient without complaints. Ambulated and voided without difficulties    Afebrile, VSS    Ext:    		Right           Radial :  No  hematoma,  No   bleeding, dressing; C/D/I      Pulses:    intact RAD  to baseline     A/P:  75 yr old F current smoker, PMHx of hyperlipidemia, preDM, CAD s/p prior PCIs (most recently 8/2023 with JOHN pLAD, patent LCx/RCA stent), COPD, depression who presented for cardiac cath with possible intervention in light of patient's risk factors, CCS Angina Class II Symptoms, and abnormal CCTA. Patient s/p diagnostic cardiac cath 6/26/25: patent mLAD stent, pLCx mild disease, normal LM, mRCA 80% (Angio FFR 0.9)     1.	Stable for discharge as per attending Dr. Arthur after bed rest, pt voids, right wrist stable and 30 minutes of ambulation.  2.	Follow-up with PMD/Cardiologist Dr. Arvizu in 1-2 weeks  3.	Discharged forms signed and copies in chart

## 2025-08-06 ENCOUNTER — APPOINTMENT (OUTPATIENT)
Dept: HEART AND VASCULAR | Facility: CLINIC | Age: 75
End: 2025-08-06
Payer: COMMERCIAL

## 2025-08-06 VITALS
OXYGEN SATURATION: 96 % | BODY MASS INDEX: 23.05 KG/M2 | TEMPERATURE: 97.8 F | HEIGHT: 64 IN | DIASTOLIC BLOOD PRESSURE: 85 MMHG | HEART RATE: 72 BPM | SYSTOLIC BLOOD PRESSURE: 140 MMHG | WEIGHT: 135 LBS

## 2025-08-06 VITALS — SYSTOLIC BLOOD PRESSURE: 125 MMHG | DIASTOLIC BLOOD PRESSURE: 80 MMHG

## 2025-08-06 PROCEDURE — 99214 OFFICE O/P EST MOD 30 MIN: CPT

## 2025-08-13 ENCOUNTER — APPOINTMENT (OUTPATIENT)
Dept: HEART AND VASCULAR | Facility: CLINIC | Age: 75
End: 2025-08-13

## 2025-08-19 ENCOUNTER — EMERGENCY (EMERGENCY)
Facility: HOSPITAL | Age: 75
LOS: 1 days | End: 2025-08-19
Attending: EMERGENCY MEDICINE | Admitting: EMERGENCY MEDICINE
Payer: COMMERCIAL

## 2025-08-19 VITALS
TEMPERATURE: 98 F | HEART RATE: 75 BPM | HEIGHT: 65 IN | SYSTOLIC BLOOD PRESSURE: 129 MMHG | DIASTOLIC BLOOD PRESSURE: 84 MMHG | RESPIRATION RATE: 18 BRPM | WEIGHT: 154.98 LBS | OXYGEN SATURATION: 99 %

## 2025-08-19 LAB
ALBUMIN SERPL ELPH-MCNC: 3 G/DL — LOW (ref 3.4–5)
ALP SERPL-CCNC: 96 U/L — SIGNIFICANT CHANGE UP (ref 40–120)
ALT FLD-CCNC: 18 U/L — SIGNIFICANT CHANGE UP (ref 12–42)
ANION GAP SERPL CALC-SCNC: 7 MMOL/L — LOW (ref 9–16)
AST SERPL-CCNC: 20 U/L — SIGNIFICANT CHANGE UP (ref 15–37)
BASOPHILS # BLD AUTO: 0.02 K/UL — SIGNIFICANT CHANGE UP (ref 0–0.2)
BASOPHILS NFR BLD AUTO: 0.4 % — SIGNIFICANT CHANGE UP (ref 0–2)
BILIRUB SERPL-MCNC: 0.3 MG/DL — SIGNIFICANT CHANGE UP (ref 0.2–1.2)
BUN SERPL-MCNC: 11 MG/DL — SIGNIFICANT CHANGE UP (ref 7–23)
CALCIUM SERPL-MCNC: 8.8 MG/DL — SIGNIFICANT CHANGE UP (ref 8.5–10.5)
CHLORIDE SERPL-SCNC: 108 MMOL/L — SIGNIFICANT CHANGE UP (ref 96–108)
CO2 SERPL-SCNC: 28 MMOL/L — SIGNIFICANT CHANGE UP (ref 22–31)
CREAT SERPL-MCNC: 0.91 MG/DL — SIGNIFICANT CHANGE UP (ref 0.5–1.3)
EGFR: 65 ML/MIN/1.73M2 — SIGNIFICANT CHANGE UP
EGFR: 65 ML/MIN/1.73M2 — SIGNIFICANT CHANGE UP
EOSINOPHIL # BLD AUTO: 0.15 K/UL — SIGNIFICANT CHANGE UP (ref 0–0.5)
EOSINOPHIL NFR BLD AUTO: 2.9 % — SIGNIFICANT CHANGE UP (ref 0–6)
GLUCOSE SERPL-MCNC: 104 MG/DL — HIGH (ref 70–99)
HCT VFR BLD CALC: 39.4 % — SIGNIFICANT CHANGE UP (ref 34.5–45)
HGB BLD-MCNC: 12.8 G/DL — SIGNIFICANT CHANGE UP (ref 11.5–15.5)
IMM GRANULOCYTES # BLD AUTO: 0.01 K/UL — SIGNIFICANT CHANGE UP (ref 0–0.07)
IMM GRANULOCYTES NFR BLD AUTO: 0.2 % — SIGNIFICANT CHANGE UP (ref 0–0.9)
LIDOCAIN IGE QN: 24 U/L — SIGNIFICANT CHANGE UP (ref 16–77)
LYMPHOCYTES # BLD AUTO: 1.32 K/UL — SIGNIFICANT CHANGE UP (ref 1–3.3)
LYMPHOCYTES NFR BLD AUTO: 25.8 % — SIGNIFICANT CHANGE UP (ref 13–44)
MAGNESIUM SERPL-MCNC: 2.2 MG/DL — SIGNIFICANT CHANGE UP (ref 1.6–2.6)
MCHC RBC-ENTMCNC: 29.6 PG — SIGNIFICANT CHANGE UP (ref 27–34)
MCHC RBC-ENTMCNC: 32.5 G/DL — SIGNIFICANT CHANGE UP (ref 32–36)
MCV RBC AUTO: 91 FL — SIGNIFICANT CHANGE UP (ref 80–100)
MONOCYTES # BLD AUTO: 0.42 K/UL — SIGNIFICANT CHANGE UP (ref 0–0.9)
MONOCYTES NFR BLD AUTO: 8.2 % — SIGNIFICANT CHANGE UP (ref 2–14)
NEUTROPHILS # BLD AUTO: 3.2 K/UL — SIGNIFICANT CHANGE UP (ref 1.8–7.4)
NEUTROPHILS NFR BLD AUTO: 62.5 % — SIGNIFICANT CHANGE UP (ref 43–77)
NRBC # BLD AUTO: 0 K/UL — SIGNIFICANT CHANGE UP (ref 0–0)
NRBC # FLD: 0 K/UL — SIGNIFICANT CHANGE UP (ref 0–0)
NRBC BLD AUTO-RTO: 0 /100 WBCS — SIGNIFICANT CHANGE UP (ref 0–0)
NT-PROBNP SERPL-SCNC: 268 PG/ML — SIGNIFICANT CHANGE UP
PLATELET # BLD AUTO: 248 K/UL — SIGNIFICANT CHANGE UP (ref 150–400)
PMV BLD: 9.8 FL — SIGNIFICANT CHANGE UP (ref 7–13)
POTASSIUM SERPL-MCNC: 4.3 MMOL/L — SIGNIFICANT CHANGE UP (ref 3.5–5.3)
POTASSIUM SERPL-SCNC: 4.3 MMOL/L — SIGNIFICANT CHANGE UP (ref 3.5–5.3)
PROT SERPL-MCNC: 6.9 G/DL — SIGNIFICANT CHANGE UP (ref 6.4–8.2)
RBC # BLD: 4.33 M/UL — SIGNIFICANT CHANGE UP (ref 3.8–5.2)
RBC # FLD: 14.1 % — SIGNIFICANT CHANGE UP (ref 10.3–14.5)
SODIUM SERPL-SCNC: 143 MMOL/L — SIGNIFICANT CHANGE UP (ref 132–145)
TROPONIN I, HIGH SENSITIVITY RESULT: 6.7 NG/L — SIGNIFICANT CHANGE UP
TROPONIN I, HIGH SENSITIVITY RESULT: 6.8 NG/L — SIGNIFICANT CHANGE UP
WBC # BLD: 5.12 K/UL — SIGNIFICANT CHANGE UP (ref 3.8–10.5)
WBC # FLD AUTO: 5.12 K/UL — SIGNIFICANT CHANGE UP (ref 3.8–10.5)

## 2025-08-19 PROCEDURE — 71045 X-RAY EXAM CHEST 1 VIEW: CPT | Mod: 26

## 2025-08-19 PROCEDURE — 99285 EMERGENCY DEPT VISIT HI MDM: CPT

## 2025-08-19 RX ORDER — ALPRAZOLAM 0.5 MG
0.25 TABLET, EXTENDED RELEASE 24 HR ORAL ONCE
Refills: 0 | Status: DISCONTINUED | OUTPATIENT
Start: 2025-08-19 | End: 2025-08-19

## 2025-08-19 RX ORDER — DULOXETINE 20 MG/1
1 CAPSULE, DELAYED RELEASE ORAL
Refills: 0
Start: 2025-08-19

## 2025-08-19 RX ORDER — DULOXETINE 20 MG/1
1 CAPSULE, DELAYED RELEASE ORAL
Qty: 30 | Refills: 0
Start: 2025-08-19 | End: 2025-09-17

## 2025-08-19 RX ADMIN — Medication 0.25 MILLIGRAM(S): at 15:31

## 2025-08-19 RX ADMIN — Medication 0.25 MILLIGRAM(S): at 12:25

## 2025-08-22 DIAGNOSIS — R00.2 PALPITATIONS: ICD-10-CM

## 2025-08-22 DIAGNOSIS — R07.2 PRECORDIAL PAIN: ICD-10-CM

## 2025-08-22 DIAGNOSIS — R06.02 SHORTNESS OF BREATH: ICD-10-CM

## 2025-08-22 DIAGNOSIS — F17.200 NICOTINE DEPENDENCE, UNSPECIFIED, UNCOMPLICATED: ICD-10-CM

## 2025-09-05 ENCOUNTER — APPOINTMENT (OUTPATIENT)
Dept: INTERNAL MEDICINE | Facility: CLINIC | Age: 75
End: 2025-09-05
Payer: COMMERCIAL

## 2025-09-05 VITALS — SYSTOLIC BLOOD PRESSURE: 150 MMHG | DIASTOLIC BLOOD PRESSURE: 80 MMHG

## 2025-09-05 VITALS
SYSTOLIC BLOOD PRESSURE: 160 MMHG | OXYGEN SATURATION: 100 % | WEIGHT: 135 LBS | HEART RATE: 65 BPM | DIASTOLIC BLOOD PRESSURE: 95 MMHG | BODY MASS INDEX: 23.17 KG/M2

## 2025-09-05 DIAGNOSIS — H81.10 BENIGN PAROXYSMAL VERTIGO, UNSPECIFIED EAR: ICD-10-CM

## 2025-09-05 DIAGNOSIS — I10 ESSENTIAL (PRIMARY) HYPERTENSION: ICD-10-CM

## 2025-09-05 DIAGNOSIS — F41.9 ANXIETY DISORDER, UNSPECIFIED: ICD-10-CM

## 2025-09-05 DIAGNOSIS — M54.9 DORSALGIA, UNSPECIFIED: ICD-10-CM

## 2025-09-05 DIAGNOSIS — Z13.820 ENCOUNTER FOR SCREENING FOR OSTEOPOROSIS: ICD-10-CM

## 2025-09-05 PROCEDURE — 99214 OFFICE O/P EST MOD 30 MIN: CPT

## 2025-09-05 PROCEDURE — G2211 COMPLEX E/M VISIT ADD ON: CPT | Mod: NC

## 2025-09-05 RX ORDER — NICOTINE TRANSDERMAL SYSTEM 21 MG/24H
21 PATCH, EXTENDED RELEASE TRANSDERMAL DAILY
Qty: 30 | Refills: 0 | Status: ACTIVE | COMMUNITY
Start: 2025-09-05 | End: 1900-01-01

## 2025-09-05 RX ORDER — ALPRAZOLAM 0.5 MG/1
0.5 TABLET ORAL
Qty: 12 | Refills: 0 | Status: ACTIVE | COMMUNITY
Start: 2025-09-05 | End: 1900-01-01

## 2025-09-10 ENCOUNTER — RX RENEWAL (OUTPATIENT)
Age: 75
End: 2025-09-10

## 2025-09-10 RX ORDER — SERTRALINE HYDROCHLORIDE 50 MG/1
50 TABLET, FILM COATED ORAL
Qty: 90 | Refills: 0 | Status: ACTIVE | COMMUNITY
Start: 2025-09-05 | End: 1900-01-01